# Patient Record
Sex: FEMALE | Race: WHITE | NOT HISPANIC OR LATINO | Employment: FULL TIME | URBAN - METROPOLITAN AREA
[De-identification: names, ages, dates, MRNs, and addresses within clinical notes are randomized per-mention and may not be internally consistent; named-entity substitution may affect disease eponyms.]

---

## 2018-11-01 LAB
HUMAN PAPILLOMAVIRUS (HPV): POSITIVE
PAP SMEAR: ABNORMAL

## 2018-12-20 PROBLEM — R87.610 PAP SMEAR ABNORMALITY OF CERVIX WITH ASCUS FAVORING DYSPLASIA: Status: ACTIVE | Noted: 2018-12-20

## 2018-12-20 PROBLEM — N92.0 MENSES REGULAR WITH EXCESSIVE BLEEDING: Status: ACTIVE | Noted: 2018-12-20

## 2020-10-06 ENCOUNTER — OFFICE VISIT (OUTPATIENT)
Dept: URGENT CARE | Facility: CLINIC | Age: 40
End: 2020-10-06
Payer: MEDICAID

## 2020-10-06 VITALS
TEMPERATURE: 98 F | DIASTOLIC BLOOD PRESSURE: 88 MMHG | SYSTOLIC BLOOD PRESSURE: 144 MMHG | BODY MASS INDEX: 25.55 KG/M2 | HEART RATE: 82 BPM | OXYGEN SATURATION: 99 % | RESPIRATION RATE: 16 BRPM | WEIGHT: 159 LBS | HEIGHT: 66 IN

## 2020-10-06 DIAGNOSIS — M54.2 NECK PAIN: Primary | ICD-10-CM

## 2020-10-06 DIAGNOSIS — M62.838 MUSCLE SPASM: ICD-10-CM

## 2020-10-06 PROCEDURE — 72040 X-RAY EXAM NECK SPINE 2-3 VW: CPT | Mod: S$GLB,,, | Performed by: RADIOLOGY

## 2020-10-06 PROCEDURE — 99203 OFFICE O/P NEW LOW 30 MIN: CPT | Mod: S$GLB,,, | Performed by: NURSE PRACTITIONER

## 2020-10-06 PROCEDURE — 99203 PR OFFICE/OUTPT VISIT, NEW, LEVL III, 30-44 MIN: ICD-10-PCS | Mod: S$GLB,,, | Performed by: NURSE PRACTITIONER

## 2020-10-06 PROCEDURE — 72040 XR CERVICAL SPINE 2 OR 3 VIEWS: ICD-10-PCS | Mod: S$GLB,,, | Performed by: RADIOLOGY

## 2020-10-06 RX ORDER — CYCLOBENZAPRINE HCL 10 MG
10 TABLET ORAL 3 TIMES DAILY PRN
Qty: 15 TABLET | Refills: 0 | Status: SHIPPED | OUTPATIENT
Start: 2020-10-06 | End: 2020-10-11

## 2020-10-06 RX ORDER — KETOROLAC TROMETHAMINE 30 MG/ML
30 INJECTION, SOLUTION INTRAMUSCULAR; INTRAVENOUS
Status: COMPLETED | OUTPATIENT
Start: 2020-10-06 | End: 2020-10-06

## 2020-10-06 RX ORDER — NAPROXEN 500 MG/1
500 TABLET ORAL 2 TIMES DAILY WITH MEALS
Qty: 14 TABLET | Refills: 0 | Status: SHIPPED | OUTPATIENT
Start: 2020-10-06 | End: 2020-10-13

## 2020-10-06 RX ADMIN — KETOROLAC TROMETHAMINE 30 MG: 30 INJECTION, SOLUTION INTRAMUSCULAR; INTRAVENOUS at 06:10

## 2020-10-06 NOTE — PATIENT INSTRUCTIONS
PLEASE READ YOUR DISCHARGE INSTRUCTIONS ENTIRELY AS IT CONTAINS IMPORTANT INFORMATION.     DO NOT TAKE ANY MORE NAPROXEN OR IBUPROFEN FOR 24 HOURS AS YOU RECEIVED A LARGE DOSE OF IT VIA INJECTION    Naproxen for pain. Eat with this medication. Consider taking an over the counter antacid (Pepcid, Nexium, Prilosec) to protect your stomach.      Ice to the area. Try an over the counter pain cream like salon pas, icy hot, bioflex.    Flexeril is a muscle relaxer - this medication will make you drowsy. Please do not drive or operate machinery while taking this. Best to take it at night or during the day if you are not driving or going to work. Please take a half first to see how it affects you.     Avoid heavy lifting, straining.      A referral to Family Medicine (establish care) and Orthopedics (neck pain) has been placed for you.  Please call [phone] to make an appt    Please follow up with your regular doctor for further treatment if your symptoms do not improve.      Please arrange follow up with your primary medical clinic as soon as possible. You must understand that you've received an Urgent Care treatment only and that you may be released before all of your medical problems are known or treated. You, the patient, will arrange for follow up as instructed. If your symptoms worsen or fail to improve you should go to the Emergency Room.  WE CANNOT RULE OUT ALL POSSIBLE CAUSES OF YOUR SYMPTOMS IN THE URGENT CARE SETTING PLEASE GO TO THE ER IF YOU FEELS YOUR CONDITION IS WORSENING OR YOU WOULD LIKE EMERGENT EVALUATION.      Neck Sprain or Strain  A sudden force that causes turning or bending of the neck can cause sprain or strain. An example would be the force from a car accident. This can stretch or tear muscles called a strain. It can also stretch or tear ligaments called a sprain. Either of these can cause neck pain. Sometimes neck pain occurs after a simple awkward movement. In either case, muscle spasm is  commonly present and contributes to the pain.     Unless you had a forceful physical injury (for example, a car accident or fall), X-rays are usually not ordered for the initial evaluation of neck pain. If pain continues and dose not respond to medical treatment, X-rays and other tests may be performed at a later time.  Home care  · You may feel more soreness and spasm the first few days after the injury. Rest until symptoms begin to improve.  · When lying down, use a comfortable pillow or a rolled towel that supports the head and keeps the spine in a neutral position. The position of the head should not be tilted forward or backward.  · Apply an ice pack over the injured area for 15 to 20 minutes every 3 to 6 hours. You should do this for the first 24 to 48 hours. You can make an ice pack by filling a plastic bag that seals at the top with ice cubes and then wrapping it with a thin towel. After 48 hours, apply heat (warm shower or warm bath) for 15 to 20 minutes several times a day, or alternate ice and heat.  · You may use over-the-counter pain medicine to control pain, unless another pain medicine was prescribed. If you have chronic liver or kidney disease or ever had a stomach ulcer or GI bleeding, talk with your healthcare provider before using these medicines.  · If a soft cervical collar was prescribed, it should be worn only for periods of increased pain. It should not be worn for more than 3 hours a day, or for a period longer than 1 to 2 weeks.  Follow-up care  Follow up with your healthcare provider as directed. Physical therapy may be needed.  Sometimes fractures dont show up on the first X-ray. Bruises and sprains can sometimes hurt as much as a fracture. These injuries can take time to heal completely. If your symptoms dont improve or they get worse, talk with your healthcare provider. You may need a repeat X-ray or other tests. If X-rays were taken, you will be told of any new findings that may  affect your care.  Call 911  Call 911 if you have:  · Neck swelling, difficulty or painful swallowing  · Difficulty breathing  · Chest pain  When to seek medical advice  Call your healthcare provider right away if any of these occur:  · Pain becomes worse or spreads into your arms  · Weakness or numbness in one or both arms  Date Last Reviewed: 11/19/2015  © 2836-0209 [employer]. [address] [place] [address], [place] [address]. All rights reserved. This information is not intended as a substitute for professional medical care. Always follow your healthcare professional's instructions.

## 2020-10-06 NOTE — PROGRESS NOTES
"Subjective:       Patient ID: Kelly Lopez is a 40 y.o. female.    Vitals:  height is 5' 6" (1.676 m) and weight is 72.1 kg (159 lb). Her temperature is 97.6 °F (36.4 °C). Her blood pressure is 144/88 (abnormal) and her pulse is 82. Her respiration is 16 and oxygen saturation is 99%.     Chief Complaint: Headache and Neck Pain    Pt presents to clinic today for evaluation of neck pain, stiffness, and associated headaches worsening x2 weeks. Pt states pain is making it difficult for her to sleep. Pt states she has had problems with her neck in the past, and was told she had bulging disks. She would like a referral to establish care with PCP and Orthopedics for further evaluation of neck pain today. She has tried taking Naproxen for symptoms, but has since stopped because she does not feel that it was providing much relief. PMHx significant for migraine headaches. Pt states she originally thought pain was a migraine, however she realized pain is originating from her neck. She denies any injury to neck, falls, tingling, numbness, weakness, syncope, eye pain, visual changes, seizures, scalp tenderness, blurred vision, cough, dizziness, swollen glands, sore throat, or N/V/D.    Headache   This is a new problem. The current episode started 1 to 4 weeks ago. Episode frequency: muscle spasms. The problem has been waxing and waning. The pain is located in the frontal region. Radiates to: base of her neck up to her head. The pain quality is not similar to prior headaches. The quality of the pain is described as aching, stabbing and throbbing. The pain is at a severity of 10/10. The pain is severe. Associated symptoms include nausea and neck pain. Pertinent negatives include no abdominal pain, abnormal behavior, anorexia, back pain, blurred vision, coughing, dizziness, drainage, ear pain, eye pain, eye redness, eye watering, facial sweating, fever, hearing loss, insomnia, loss of balance, muscle aches, numbness, " phonophobia, photophobia, rhinorrhea, scalp tenderness, seizures, sinus pressure, sore throat, swollen glands, tingling, tinnitus, visual change, vomiting, weakness or weight loss. The symptoms are aggravated by activity. She has tried NSAIDs for the symptoms. The treatment provided mild relief. Her past medical history is significant for migraine headaches. There is no history of cancer, cluster headaches, hypertension, immunosuppression, migraines in the family, obesity, pseudotumor cerebri, recent head traumas, sinus disease or TMJ.       Constitution: Negative for fever.   HENT: Negative for ear pain, tinnitus, hearing loss, sinus pressure and sore throat.    Neck: Positive for neck pain and neck stiffness. Negative for painful lymph nodes and neck swelling.   Cardiovascular: Negative for chest pain.   Eyes: Negative for eye pain, eye redness, photophobia and blurred vision.   Respiratory: Negative for cough.    Gastrointestinal: Positive for nausea. Negative for abdominal pain, vomiting and diarrhea.   Musculoskeletal: Negative for back pain.   Neurological: Positive for headaches and history of migraines. Negative for dizziness, loss of balance, numbness and seizures.   Hematologic/Lymphatic: Negative for swollen lymph nodes.   Psychiatric/Behavioral: The patient does not have insomnia.        Objective:      Physical Exam   Constitutional: She is oriented to person, place, and time. She appears well-developed.  Non-toxic appearance. She does not appear ill. No distress.      Comments:Pt calm and cooperative speaking in full sentences with ease. Gait smooth and steady. NAD noted.   HENT:   Head: Normocephalic and atraumatic.   Ears:   Right Ear: Hearing, tympanic membrane, external ear and ear canal normal.   Left Ear: Hearing, tympanic membrane, external ear and ear canal normal.   Nose: Nose normal. No mucosal edema, rhinorrhea or nasal deformity. No epistaxis. Right sinus exhibits no maxillary sinus  tenderness and no frontal sinus tenderness. Left sinus exhibits no maxillary sinus tenderness and no frontal sinus tenderness.   Mouth/Throat: Uvula is midline, oropharynx is clear and moist and mucous membranes are normal. No trismus in the jaw. Normal dentition. No uvula swelling. No posterior oropharyngeal erythema.   Eyes: Pupils are equal, round, and reactive to light. Conjunctivae, EOM and lids are normal. No scleral icterus.   Neck: Trachea normal, normal range of motion, full passive range of motion without pain and phonation normal. Neck supple. No neck rigidity.   Cardiovascular: Normal rate, regular rhythm, normal heart sounds and normal pulses.   Pulmonary/Chest: Effort normal and breath sounds normal. No respiratory distress.   Abdominal: Soft. Normal appearance and bowel sounds are normal. She exhibits no distension. There is no abdominal tenderness.   Musculoskeletal: Normal range of motion.         General: No deformity.      Cervical back: She exhibits tenderness, pain and spasm. She exhibits normal range of motion, no bony tenderness, no swelling, no edema, no deformity, no laceration and normal pulse.        Back:    Neurological: She is alert and oriented to person, place, and time. No cranial nerve deficit. She exhibits normal muscle tone. Gait normal. Coordination normal. GCS eye subscore is 4. GCS verbal subscore is 5. GCS motor subscore is 6.   Skin: Skin is warm, dry, intact, not diaphoretic and not pale. Psychiatric: Her speech is normal and behavior is normal. Judgment and thought content normal.   Nursing note and vitals reviewed.      Xr Cervical Spine 2 Or 3 Views    Result Date: 10/6/2020  EXAMINATION: XR CERVICAL SPINE 2 OR 3 VIEWS CLINICAL HISTORY: Cervicalgia. TECHNIQUE: AP, lateral and open mouth views of the cervical spine were performed. COMPARISON: None. FINDINGS: There is reversal of the usual cervical lordosis which may be related to patient positioning or muscle spasm.  There  is minimal anterolisthesis of C3 on C4 and C4 on C5 and minimal retrolisthesis of C5 on C6.  There is mild joint space narrowing of C5-C6 and C6-C7.  There are anterior osteophytes from C4-C5 through C6-C7.  There is no acute fracture or dislocation.  The vertebral body heights are maintained.     Degenerative changes of the cervical spine as above without acute osseous abnormality. Electronically signed by: Getachew Benson Date:    10/06/2020 Time:    19:11  Assessment:       1. Neck pain    2. Muscle spasm        Plan:         Neck pain  -     XR Cervical Spine 2 or 3 Views; Future; Expected date: 10/06/2020  -     ketorolac injection 30 mg  -     naproxen (NAPROSYN) 500 MG tablet; Take 1 tablet (500 mg total) by mouth 2 (two) times daily with meals. for 7 days  Dispense: 14 tablet; Refill: 0  -     Ambulatory referral/consult to Family Practice  -     Ambulatory referral/consult to Orthopedics    Muscle spasm  -     cyclobenzaprine (FLEXERIL) 10 MG tablet; Take 1 tablet (10 mg total) by mouth 3 (three) times daily as needed. This medication will make you drowsy.  Dispense: 15 tablet; Refill: 0  -     Ambulatory referral/consult to Family Practice    Discussed xray findings (Degenerative changes of the cervical spine as above without acute osseous abnormality), diagnosis, and treatment plan today. Referrals to Family Practice sent to establish care as well as Orthopedics for further evaluation of neck pain. All applicable EKG, medical records, labs, imaging reviewed in Epic and discussed with patient. Instructed to follow up with PCP or go to ER if symptoms fail to improve or worsen. Pt verbalizes understanding.       Patient Instructions   PLEASE READ YOUR DISCHARGE INSTRUCTIONS ENTIRELY AS IT CONTAINS IMPORTANT INFORMATION.     DO NOT TAKE ANY MORE NAPROXEN OR IBUPROFEN FOR 24 HOURS AS YOU RECEIVED A LARGE DOSE OF IT VIA INJECTION    Naproxen for pain. Eat with this medication. Consider taking an over the counter  antacid (Pepcid, Nexium, Prilosec) to protect your stomach.      Ice to the area. Try an over the counter pain cream like salon pas, icy hot, bioflex.    Flexeril is a muscle relaxer - this medication will make you drowsy. Please do not drive or operate machinery while taking this. Best to take it at night or during the day if you are not driving or going to work. Please take a half first to see how it affects you.     Avoid heavy lifting, straining.      A referral to Family Medicine (establish care) and Orthopedics (neck pain) has been placed for you.  Please call [phone] to make an appt    Please follow up with your regular doctor for further treatment if your symptoms do not improve.      Please arrange follow up with your primary medical clinic as soon as possible. You must understand that you've received an Urgent Care treatment only and that you may be released before all of your medical problems are known or treated. You, the patient, will arrange for follow up as instructed. If your symptoms worsen or fail to improve you should go to the Emergency Room.  WE CANNOT RULE OUT ALL POSSIBLE CAUSES OF YOUR SYMPTOMS IN THE URGENT CARE SETTING PLEASE GO TO THE ER IF YOU FEELS YOUR CONDITION IS WORSENING OR YOU WOULD LIKE EMERGENT EVALUATION.      Neck Sprain or Strain  A sudden force that causes turning or bending of the neck can cause sprain or strain. An example would be the force from a car accident. This can stretch or tear muscles called a strain. It can also stretch or tear ligaments called a sprain. Either of these can cause neck pain. Sometimes neck pain occurs after a simple awkward movement. In either case, muscle spasm is commonly present and contributes to the pain.     Unless you had a forceful physical injury (for example, a car accident or fall), X-rays are usually not ordered for the initial evaluation of neck pain. If pain continues and dose not respond to medical treatment, X-rays and other  tests may be performed at a later time.  Home care  · You may feel more soreness and spasm the first few days after the injury. Rest until symptoms begin to improve.  · When lying down, use a comfortable pillow or a rolled towel that supports the head and keeps the spine in a neutral position. The position of the head should not be tilted forward or backward.  · Apply an ice pack over the injured area for 15 to 20 minutes every 3 to 6 hours. You should do this for the first 24 to 48 hours. You can make an ice pack by filling a plastic bag that seals at the top with ice cubes and then wrapping it with a thin towel. After 48 hours, apply heat (warm shower or warm bath) for 15 to 20 minutes several times a day, or alternate ice and heat.  · You may use over-the-counter pain medicine to control pain, unless another pain medicine was prescribed. If you have chronic liver or kidney disease or ever had a stomach ulcer or GI bleeding, talk with your healthcare provider before using these medicines.  · If a soft cervical collar was prescribed, it should be worn only for periods of increased pain. It should not be worn for more than 3 hours a day, or for a period longer than 1 to 2 weeks.  Follow-up care  Follow up with your healthcare provider as directed. Physical therapy may be needed.  Sometimes fractures dont show up on the first X-ray. Bruises and sprains can sometimes hurt as much as a fracture. These injuries can take time to heal completely. If your symptoms dont improve or they get worse, talk with your healthcare provider. You may need a repeat X-ray or other tests. If X-rays were taken, you will be told of any new findings that may affect your care.  Call 911  Call 911 if you have:  · Neck swelling, difficulty or painful swallowing  · Difficulty breathing  · Chest pain  When to seek medical advice  Call your healthcare provider right away if any of these occur:  · Pain becomes worse or spreads into your  arms  · Weakness or numbness in one or both arms  Date Last Reviewed: 11/19/2015  © 3958-6781 [employer]. [address] [place] [address], [place] [address]. All rights reserved. This information is not intended as a substitute for professional medical care. Always follow your healthcare professional's instructions.

## 2020-10-07 ENCOUNTER — TELEPHONE (OUTPATIENT)
Dept: ORTHOPEDICS | Facility: CLINIC | Age: 40
End: 2020-10-07

## 2020-10-07 NOTE — TELEPHONE ENCOUNTER
calling to schedule ortho referral, left message to call back    Neck issue, [phone]  Back and spine

## 2020-10-19 ENCOUNTER — OFFICE VISIT (OUTPATIENT)
Dept: FAMILY MEDICINE | Facility: CLINIC | Age: 40
End: 2020-10-19
Payer: MEDICAID

## 2020-10-19 ENCOUNTER — PATIENT OUTREACH (OUTPATIENT)
Dept: ADMINISTRATIVE | Facility: HOSPITAL | Age: 40
End: 2020-10-19

## 2020-10-19 VITALS
HEART RATE: 80 BPM | OXYGEN SATURATION: 98 % | TEMPERATURE: 98 F | WEIGHT: 168.63 LBS | HEIGHT: 66 IN | SYSTOLIC BLOOD PRESSURE: 110 MMHG | DIASTOLIC BLOOD PRESSURE: 80 MMHG | BODY MASS INDEX: 27.1 KG/M2

## 2020-10-19 DIAGNOSIS — G47.01 INSOMNIA DUE TO MEDICAL CONDITION: ICD-10-CM

## 2020-10-19 DIAGNOSIS — Z12.31 BREAST CANCER SCREENING BY MAMMOGRAM: ICD-10-CM

## 2020-10-19 DIAGNOSIS — M47.892 OTHER SPONDYLOSIS, CERVICAL REGION: ICD-10-CM

## 2020-10-19 DIAGNOSIS — M54.2 CERVICALGIA: Primary | ICD-10-CM

## 2020-10-19 DIAGNOSIS — F41.9 ANXIETY: ICD-10-CM

## 2020-10-19 DIAGNOSIS — H60.543 ECZEMA OF EXTERNAL EAR, BILATERAL: ICD-10-CM

## 2020-10-19 DIAGNOSIS — R00.2 PALPITATIONS: ICD-10-CM

## 2020-10-19 PROCEDURE — 99214 OFFICE O/P EST MOD 30 MIN: CPT | Mod: PBBFAC,PO | Performed by: INTERNAL MEDICINE

## 2020-10-19 PROCEDURE — 99214 OFFICE O/P EST MOD 30 MIN: CPT | Mod: S$PBB,,, | Performed by: INTERNAL MEDICINE

## 2020-10-19 PROCEDURE — 99214 PR OFFICE/OUTPT VISIT, EST, LEVL IV, 30-39 MIN: ICD-10-PCS | Mod: S$PBB,,, | Performed by: INTERNAL MEDICINE

## 2020-10-19 PROCEDURE — 99999 PR PBB SHADOW E&M-EST. PATIENT-LVL IV: ICD-10-PCS | Mod: PBBFAC,,, | Performed by: INTERNAL MEDICINE

## 2020-10-19 PROCEDURE — 99999 PR PBB SHADOW E&M-EST. PATIENT-LVL IV: CPT | Mod: PBBFAC,,, | Performed by: INTERNAL MEDICINE

## 2020-10-19 RX ORDER — FLUOXETINE 10 MG/1
10 CAPSULE ORAL DAILY
Qty: 30 CAPSULE | Refills: 11 | Status: SHIPPED | OUTPATIENT
Start: 2020-10-19 | End: 2021-09-21 | Stop reason: SDUPTHER

## 2020-10-19 RX ORDER — METHYLPREDNISOLONE 4 MG/1
TABLET ORAL
Qty: 1 PACKAGE | Refills: 0 | Status: SHIPPED | OUTPATIENT
Start: 2020-10-19 | End: 2020-11-09

## 2020-10-19 RX ORDER — ZOLPIDEM TARTRATE 5 MG/1
5 TABLET ORAL NIGHTLY PRN
Qty: 30 TABLET | Refills: 0 | Status: SHIPPED | OUTPATIENT
Start: 2020-10-19 | End: 2021-09-21

## 2020-10-19 NOTE — LETTER
October 19, 2020      Yi Elliott, NP  [address] [facility] [place] [address]           [facility] [address] [place] [address]  Phone: [phone]  Fax: [phone]          Patient: Kelly Lopez   MR Number: [id number]   YOB: 1980   Date of Visit: 10/19/2020       Dear Yi Elliott:    Thank you for referring Kelly Lopez to me for evaluation. Attached you will find relevant portions of my assessment and plan of care.    If you have questions, please do not hesitate to call me. I look forward to following Kelly Lopze along with you.    Sincerely,    Aleksandr Fowler MD    Enclosure  CC:  No Recipients    If you would like to receive this communication electronically, please contact [email or web address] or [phone] to request more information on [employer] access.    For providers and/or their staff who would like to refer a patient to [facility], please contact us through our one-stop-shop provider referral line, [facility], at [phone].    If you feel you have received this communication in error or would no longer like to receive these types of communications, please e-mail [email or web address]

## 2020-10-19 NOTE — PROGRESS NOTES
Patient ID: Kelly Lopez     Chief Complaint:   Chief Complaint   Patient presents with    Establish Care    pain in back of neck     consistent throbs throgh out entire head        HPI: New Patient to me w/ a few complaints:     Neck pain (cervicalgia) x many years, s/p SHARRI without improvement- has been MUCH worse x 1 month. Cervical XR reviewed and it does have degenerative joint disease- needs MRI due to tingling in bilateral hands.   Insomnia unrelieved by Melatonin- try Ambien. She does want ability to awaken if needed, so I asked her to try it on the weekend.   Anxiety (longstanding)- requests refill on Prozac 10 mg / day as she's felt better on it.   Palpitations and father has A-fib and sister has an unknown heart condition- refer to Cards.   Concerned about weight gain. I eventually want to get her back tot he gym, but cehck labs first.     Review of Systems   Constitutional: Negative.    HENT: Negative.    Eyes: Negative.    Respiratory: Negative.    Cardiovascular: Positive for chest pain and palpitations.   Gastrointestinal: Negative.    Endocrine: Negative.    Genitourinary: Negative.    Musculoskeletal: Negative.    Skin: Negative.    Allergic/Immunologic: Negative.    Neurological: Negative.    Hematological: Negative.    Psychiatric/Behavioral: Negative.           Objective:      Physical Exam   Physical Exam  Vitals signs and nursing note reviewed.   Constitutional:       Appearance: Normal appearance. She is well-developed.   HENT:      Head: Normocephalic and atraumatic.      Nose: Nose normal.   Eyes:      Extraocular Movements: Extraocular movements intact.      Conjunctiva/sclera: Conjunctivae normal.      Pupils: Pupils are equal, round, and reactive to light.   Neck:      Musculoskeletal: Normal range of motion and neck supple.   Cardiovascular:      Rate and Rhythm: Normal rate and regular rhythm.      Pulses: Normal pulses.      Heart sounds: Normal heart sounds.   Pulmonary:       "Effort: Pulmonary effort is normal.      Breath sounds: Normal breath sounds.   Abdominal:      General: Bowel sounds are normal.      Palpations: Abdomen is soft.   Musculoskeletal: Normal range of motion.   Skin:     General: Skin is warm and dry.      Capillary Refill: Capillary refill takes less than 2 seconds.   Neurological:      General: No focal deficit present.      Mental Status: She is alert and oriented to person, place, and time.   Psychiatric:         Mood and Affect: Mood normal.         Behavior: Behavior normal.         Thought Content: Thought content normal.         Judgment: Judgment normal.            Vitals:   Vitals:    10/19/20 1010   BP: 110/80   BP Location: Right arm   Patient Position: Sitting   BP Method: Medium (Manual)   Pulse: 80   Temp: 98 °F (36.7 °C)   TempSrc: Oral   SpO2: 98%   Weight: 76.5 kg (168 lb 10.4 oz)   Height: 5' 6" (1.676 m)          Current Outpatient Medications:     FLUoxetine 10 MG capsule, Take 1 capsule (10 mg total) by mouth once daily., Disp: 30 capsule, Rfl: 11    methylPREDNISolone (MEDROL DOSEPACK) 4 mg tablet, use as directed, Disp: 1 Package, Rfl: 0    zolpidem (AMBIEN) 5 MG Tab, Take 1 tablet (5 mg total) by mouth nightly as needed., Disp: 30 tablet, Rfl: 0   Assessment:       Patient Active Problem List    Diagnosis Date Noted    Anxiety     Cervicalgia     Eczema of external ear, bilateral     Insomnia due to medical condition     Palpitations     Pap smear abnormality of cervix with ASCUS favoring dysplasia 12/20/2018    Menses regular with excessive bleeding 12/20/2018          Plan:       Kelly Loepz  was seen today for follow-up and may need lab work.    Diagnoses and all orders for this visit:    Kelly was seen today for establish care and pain in back of neck.    Diagnoses and all orders for this visit:    Cervicalgia  -     methylPREDNISolone (MEDROL DOSEPACK) 4 mg tablet; use as directed    Insomnia due to medical " condition  -     zolpidem (AMBIEN) 5 MG Tab; Take 1 tablet (5 mg total) by mouth nightly as needed.    Anxiety  -     FLUoxetine 10 MG capsule; Take 1 capsule (10 mg total) by mouth once daily.    Other spondylosis, cervical region  -     MRI Cervical Spine Without Contrast; Future    Breast cancer screening by mammogram  -     Mammo Digital Screening Bilat w/ Driss; Future    Eczema of external ear, bilateral  No treatment per Patient     Palpitations  -     CBC auto differential; Future  -     Comprehensive Metabolic Panel; Future  -     Lipid Panel; Future  -     TSH; Future  -     Ambulatory referral/consult to Cardiology; Future

## 2020-11-02 ENCOUNTER — TELEPHONE (OUTPATIENT)
Dept: FAMILY MEDICINE | Facility: CLINIC | Age: 40
End: 2020-11-02

## 2020-11-02 DIAGNOSIS — M54.2 CERVICALGIA: Primary | ICD-10-CM

## 2020-11-02 NOTE — TELEPHONE ENCOUNTER
----- Message from Patricia Gomez, RT sent at 11/2/2020  8:15 AM CST -----  Regarding: denila/peer to peer  Contact: Libby CASTILLO [id number]  Good morning! Patient is scheduled for an mri on tomorrow, but has been denied by insurance. A peer to peer can be done to overrule. Can you tell me if this will be done before noon today in case patient needs to be canceled? If not, patient will need to notified and canceled.    Thanks

## 2020-11-03 NOTE — TELEPHONE ENCOUNTER
Callback to patient--patient notified, she will try either neuro or back and spine.  Please advise

## 2020-11-03 NOTE — TELEPHONE ENCOUNTER
Spoke with patient and scheduled her an appointment for tomorrow with Peyton Caputo in the [facility], she indicated understanding.

## 2020-11-03 NOTE — TELEPHONE ENCOUNTER
----- Message from Suresh Lacey sent at 11/2/2020  4:25 PM CST -----  Regarding: return call  Type:  Patient Returning Call    Who Called:  pt  Who Left Message for Patient:  thomas  Does the patient know what this is regarding?:  mri  Best Call Back Number:  [phone]   Additional Information:

## 2020-11-04 ENCOUNTER — OFFICE VISIT (OUTPATIENT)
Dept: SPINE | Facility: CLINIC | Age: 40
End: 2020-11-04
Payer: MEDICAID

## 2020-11-04 ENCOUNTER — CLINICAL SUPPORT (OUTPATIENT)
Dept: REHABILITATION | Facility: HOSPITAL | Age: 40
End: 2020-11-04
Payer: MEDICAID

## 2020-11-04 VITALS
SYSTOLIC BLOOD PRESSURE: 119 MMHG | HEIGHT: 66 IN | WEIGHT: 167.13 LBS | HEART RATE: 82 BPM | BODY MASS INDEX: 26.86 KG/M2 | DIASTOLIC BLOOD PRESSURE: 77 MMHG

## 2020-11-04 DIAGNOSIS — M54.2 NECK PAIN: ICD-10-CM

## 2020-11-04 DIAGNOSIS — M47.812 CERVICAL SPONDYLOSIS: ICD-10-CM

## 2020-11-04 DIAGNOSIS — M54.2 CERVICALGIA: ICD-10-CM

## 2020-11-04 DIAGNOSIS — M47.812 CERVICAL SPONDYLOSIS: Primary | ICD-10-CM

## 2020-11-04 DIAGNOSIS — R29.898 DECREASED ROM OF NECK: ICD-10-CM

## 2020-11-04 PROCEDURE — 97161 PT EVAL LOW COMPLEX 20 MIN: CPT | Mod: PO

## 2020-11-04 PROCEDURE — 99999 PR PBB SHADOW E&M-EST. PATIENT-LVL IV: CPT | Mod: PBBFAC,,, | Performed by: PHYSICIAN ASSISTANT

## 2020-11-04 PROCEDURE — 99203 PR OFFICE/OUTPT VISIT, NEW, LEVL III, 30-44 MIN: ICD-10-PCS | Mod: S$PBB,,, | Performed by: PHYSICIAN ASSISTANT

## 2020-11-04 PROCEDURE — 97140 MANUAL THERAPY 1/> REGIONS: CPT | Mod: PO

## 2020-11-04 PROCEDURE — 99203 OFFICE O/P NEW LOW 30 MIN: CPT | Mod: S$PBB,,, | Performed by: PHYSICIAN ASSISTANT

## 2020-11-04 PROCEDURE — 99999 PR PBB SHADOW E&M-EST. PATIENT-LVL IV: ICD-10-PCS | Mod: PBBFAC,,, | Performed by: PHYSICIAN ASSISTANT

## 2020-11-04 PROCEDURE — 99214 OFFICE O/P EST MOD 30 MIN: CPT | Mod: PBBFAC,PN | Performed by: PHYSICIAN ASSISTANT

## 2020-11-04 RX ORDER — TIZANIDINE 4 MG/1
4 TABLET ORAL NIGHTLY PRN
Qty: 40 TABLET | Refills: 0 | Status: SHIPPED | OUTPATIENT
Start: 2020-11-04 | End: 2021-09-21

## 2020-11-04 NOTE — LETTER
November 8, 2020      Aleksandr Fowler MD  [address] [facility] [address] [place] [address]           [place] [facility]  [address] [place] [address]  Phone: [phone]  Fax: [phone]          Patient: Kelly Loepz   MR Number: [id number]   YOB: 1980   Date of Visit: 11/4/2020       Dear Dr. Aleksandr Fowler:    Thank you for referring Kelly Lopez to me for evaluation. Attached you will find relevant portions of my assessment and plan of care.    If you have questions, please do not hesitate to call me. I look forward to following Kelly Lopez along with you.    Sincerely,    Peyton Caputo PA-C    Enclosure  CC:  No Recipients    If you would like to receive this communication electronically, please contact [email or web address] or [phone] to request more information on [employer] access.    For providers and/or their staff who would like to refer a patient to [facility], please contact us through our one-stop-shop provider referral line, [facility], at [phone].    If you feel you have received this communication in error or would no longer like to receive these types of communications, please e-mail [email or web address]

## 2020-11-04 NOTE — PROGRESS NOTES
Back and Spine Consult    Patient ID: Kelly Lopez is a 40 y.o. female.    Chief Complaint   Patient presents with    Neck Pain       Review of Systems   Constitutional: Negative for activity change, appetite change, chills, fever and unexpected weight change.   HENT: Negative for tinnitus, trouble swallowing and voice change.    Respiratory: Negative for apnea, cough, chest tightness and shortness of breath.    Cardiovascular: Negative for chest pain and palpitations.   Gastrointestinal: Negative for constipation, diarrhea, nausea and vomiting.   Genitourinary: Negative for difficulty urinating, dysuria, frequency and urgency.   Musculoskeletal: Positive for neck pain. Negative for back pain, gait problem and neck stiffness.   Skin: Negative for wound.   Neurological: Positive for headaches. Negative for dizziness, tremors, seizures, facial asymmetry, speech difficulty, weakness, light-headedness and numbness.   Psychiatric/Behavioral: Negative for confusion and decreased concentration.       Past Medical History:   Diagnosis Date    Anxiety     Cervicalgia     Eczema of external ear, bilateral     H/O bronchitis     Insomnia due to medical condition     Palpitations      Social History     Socioeconomic History    Marital status: Single     Spouse name: Not on file    Number of children: Not on file    Years of education: Not on file    Highest education level: Not on file   Occupational History    Occupation:     Social Needs    Financial resource strain: Not very hard    Food insecurity     Worry: Never true     Inability: Never true    Transportation needs     Medical: No     Non-medical: No   Tobacco Use    Smoking status: Never Smoker    Smokeless tobacco: Never Used   Substance and Sexual Activity    Alcohol use: Yes     Alcohol/week: 5.0 standard drinks     Types: 5 Glasses of wine per week     Frequency: 4 or more times a week     Drinks per session: 1 or 2      "Binge frequency: Never    Drug use: No    Sexual activity: Not on file   Lifestyle    Physical activity     Days per week: 0 days     Minutes per session: 0 min    Stress: To some extent   Relationships    Social connections     Talks on phone: Not on file     Gets together: Not on file     Attends Oriental orthodox service: Not on file     Active member of club or organization: Not on file     Attends meetings of clubs or organizations: Not on file     Relationship status: Not on file   Other Topics Concern    Not on file   Social History Narrative    Not on file     Family History   Problem Relation Age of Onset    Hyperlipidemia Mother     Hyperlipidemia Father     Atrial fibrillation Father     Stroke Father     Heart murmur Sister      Review of patient's allergies indicates:  No Known Allergies    Current Outpatient Medications:     FLUoxetine 10 MG capsule, Take 1 capsule (10 mg total) by mouth once daily., Disp: 30 capsule, Rfl: 11    zolpidem (AMBIEN) 5 MG Tab, Take 1 tablet (5 mg total) by mouth nightly as needed., Disp: 30 tablet, Rfl: 0    methylPREDNISolone (MEDROL DOSEPACK) 4 mg tablet, use as directed (Patient not taking: Reported on 11/4/2020), Disp: 1 Package, Rfl: 0    Vitals:    11/04/20 0953   BP: 119/77   BP Location: Left arm   Patient Position: Sitting   BP Method: Medium (Automatic)   Pulse: 82   Weight: 75.8 kg (167 lb 1.7 oz)   Height: 5' 6" (1.676 m)       Physical Exam  Vitals signs and nursing note reviewed.   Constitutional:       Appearance: She is well-developed.   HENT:      Head: Normocephalic and atraumatic.   Eyes:      Pupils: Pupils are equal, round, and reactive to light.   Neck:      Musculoskeletal: Normal range of motion and neck supple.   Cardiovascular:      Rate and Rhythm: Normal rate.   Pulmonary:      Effort: Pulmonary effort is normal.   Musculoskeletal: Normal range of motion.   Skin:     General: Skin is warm and dry.   Neurological:      Mental Status: She " is alert and oriented to person, place, and time.      Coordination: Finger-Nose-Finger Test, Heel to Shin Test and Romberg Test normal.      Gait: Gait is intact. Tandem walk normal.      Deep Tendon Reflexes:      Reflex Scores:       Tricep reflexes are 2+ on the right side and 2+ on the left side.       Bicep reflexes are 2+ on the right side and 2+ on the left side.       Brachioradialis reflexes are 2+ on the right side and 2+ on the left side.       Patellar reflexes are 2+ on the right side and 2+ on the left side.       Achilles reflexes are 2+ on the right side and 2+ on the left side.  Psychiatric:         Speech: Speech normal.         Behavior: Behavior normal.         Thought Content: Thought content normal.         Judgment: Judgment normal.         Neurologic Exam     Mental Status   Oriented to person, place, and time.   Oriented to person.   Oriented to place.   Oriented to time.   Follows 3 step commands.   Attention: normal. Concentration: normal.   Speech: speech is normal   Level of consciousness: alert  Knowledge: consistent with education.   Able to name object. Able to read. Able to repeat. Able to write. Normal comprehension.     Cranial Nerves     CN II   Visual acuity: normal  Right visual field deficit: none  Left visual field deficit: none     CN III, IV, VI   Pupils are equal, round, and reactive to light.  Right pupil: Size: 3 mm. Shape: regular. Reactivity: brisk. Consensual response: intact.   Left pupil: Size: 3 mm. Shape: regular. Reactivity: brisk. Consensual response: intact.   CN III: no CN III palsy  CN VI: no CN VI palsy  Nystagmus: none   Diplopia: none  Ophthalmoparesis: none  Conjugate gaze: present    CN V   Right facial sensation deficit: none  Left facial sensation deficit: none    CN VII   Right facial weakness: none  Left facial weakness: none    CN VIII   Hearing: intact    CN IX, X   CN IX normal.   CN X normal.     CN XI   Right sternocleidomastoid strength:  normal  Left sternocleidomastoid strength: normal  Right trapezius strength: normal  Left trapezius strength: normal    CN XII   Fasciculations: absent  Tongue deviation: none    Motor Exam   Muscle bulk: normal  Overall muscle tone: normal  Right arm pronator drift: absent  Left arm pronator drift: absent    Strength   Right neck flexion: 5/5  Left neck flexion: 5/5  Right neck extension: 5/5  Left neck extension: 5/5  Right deltoid: 5/5  Left deltoid: 5/5  Right biceps: 5/5  Left biceps: 5/5  Right triceps: 5/5  Left triceps: 5/5  Right wrist flexion: 5/5  Left wrist flexion: 5/5  Right wrist extension: 5/5  Left wrist extension: 5/5  Right interossei: 55  Left interossei: /  Right abdominals: 5  Left abdominals: 5  Right iliopsoas:   Left iliopsoas: 5/  Right quadriceps: 5/5  Left quadriceps: 5/  Right hamstrin/5  Left hamstrin/5  Right glutei:   Left glutei:   Right anterior tibial: 5/5  Left anterior tibial: 5/  Right posterior tibial: 5/  Left posterior tibial: 5/  Right peroneal: 5  Left peroneal:   Right gastroc: 5/  Left gastroc:     Sensory Exam   Right arm light touch: normal  Left arm light touch: normal  Right leg light touch: normal  Left leg light touch: normal  Right arm vibration: normal  Left arm vibration: normal  Right arm pinprick: normal  Left arm pinprick: normal    Gait, Coordination, and Reflexes     Gait  Gait: normal    Coordination   Romberg: negative  Finger to nose coordination: normal  Heel to shin coordination: normal  Tandem walking coordination: normal    Tremor   Resting tremor: absent  Intention tremor: absent  Action tremor: absent    Reflexes   Right brachioradialis: 2+  Left brachioradialis: 2+  Right biceps: 2+  Left biceps: 2+  Right triceps: 2+  Left triceps: 2+  Right patellar: 2+  Left patellar: 2+  Right achilles: 2+  Left achilles: 2+  Right Gonsalez: absent  Left Gonsalez: absent  Right ankle clonus: absent  Left ankle clonus:  absent      Provider dictation:    40 year old female who is relatively healthy is referred by Dr. Fowler for evaluation of neck pain/ headaches.  She awoke one morning 2 months ago with pain in the neck/ head.  She denies any trauma.  Pain is felt in the left side and posterior neck up tot he base of the skull.  At its worst, she had pain all over the head affecting the left greater than right side.  She tried a prednisone taper (started 10-19-20) without benefit.  She has also tried naproxen and ibuprofen.  She has not had PT or SHARRI.    NDI:  68%.  PHQ:  23.    On exam, she has 5/5 strength with 2+ DTR and no sensory deficits.  Gait and station fluid.  Denies bowel/ bladder dysfunction.  Negative Hoffmans.      Xray cervical spine from 10-6-20 reviewed.  Degenerative changes are present in the mid cervical spine at C4/5, C5/6 and C6/7.    In conclusion, Ms. Mendoza has acute onset left/ posterior neck pain with headaches:  Differential at this time time includes myofascial pain/ spasms, occipital neuralgia or pain associated with degenerative changes.  Given she has no radiculopathy and no neurological deficits, I recommend PT.  We will try a different muscle relaxant - stop flexeril and start zanaflex.  If no improvement with PT, we can consider pursue MRI to consider SHARRI, MBB/ RFA, and occiptial nerve block.  Follow up in 8 weeks after PT.      Visit Diagnosis:  Cervicalgia  -     Ambulatory referral[facility]consult to [facility]        Total time spent counseling greater than fifty percent of total visit time.  Counseling included discussion regarding imaging findings, diagnosis possibilities, treatment options, risks and benefits.   The patient had many questions regarding the options and long-term effects.

## 2020-11-04 NOTE — PLAN OF CARE
[facility]  Physical Therapy Initial Evaluation    Name: Kelly Lopez  Clinic Number: [id number]    Therapy Diagnosis:   Encounter Diagnoses   Name Primary?    Cervicalgia     Cervical spondylosis     Neck pain     Decreased ROM of neck      Physician: Peyton Caputo PA-C  Physician Orders: PT Eval and Treat   Medical Diagnosis from Referral: Cervical spondylosis   Cervicalgia   Evaluation Date: 11/4/2020  Authorization Period Expiration: 11/04/201  Plan of Care Expiration: 12/04/2020  Visit # / Visits authorized: 1/ 1    Time In: 1055  Time Out: 1135  Total Billable Time: 40 minutes    Precautions: Standard    Subjective   Date of onset: Two months   History of current condition - Kelly reports: She reports she woke up two months ago with neck pain without any mechanism injury. She denies any shooting or referral pain into her hands. Kelly reports her pain is only on the (L) side and follows a anitha horn type pattern. She states she has tried Advil, ibuprofen, and muscle relaxers. She reports she is here in order to get her MRI.     Medical History:   Past Medical History:   Diagnosis Date    Anxiety     Cervicalgia     Eczema of external ear, bilateral     H/O bronchitis     Insomnia due to medical condition     Palpitations        Surgical History:   Kelly Lopez  has a past surgical history that includes Tonsillectomy; Cervical conization w/ laser; Epidural block injection; Tubal ligation; and Thermal ablation of endometrium using hysteroscopy (N/A, 12/20/2018).    Medications:   Kelly has a current medication list which includes the following prescription(s): fluoxetine, methylprednisolone, tizanidine, and zolpidem.    Allergies:   Review of patient's allergies indicates:  No Known Allergies     Imaging, X-ray: Degenerative changes of the cervical spine as above without acute osseous abnormality.    Prior Therapy: No   Social History: Lives with  family   Occupation: [employer], desk job  Prior Level of Function: No limitations with her neck   Current Level of Function: Chronic neck pain with all ADLs     Pain:  Current 8/10, worst 10/10, best 6/10   Location: left neck   Description: Throbbing  Aggravating Factors: Everything  Easing Factors: nothing    Pts goals: To have no pain, get an MRI    Red Flag Screening:   Cough  Sneeze  Strain: (+)  Bladder/ bowel: (--)  Falls: (--)  Night pain: (--)  Unexplained weight loss: (--)  General health: Anxiety      Objective     Observation: Patient in no acute distress    Posture: Forward head posture in sitting    Cervical Range of Motion:    % Limitation Pain   Flexion 90% Increased pain     Extension 50% Increased pain, guarding     Right Rotation 75% Increased pain, guarding     Left Rotation 90% Increased pain, guarding        Shoulder Range of Motion:   Shoulder Left Right   Flexion WFL WFL   Abduction WFL WFL   Flexion + ER WFL WFL   Extension + IR WFL WFL     No pain with shoulder AROM    Strength:    Upper Extremity Strength  (R) UE  (L) UE    Shoulder flexion: 4+/5 Shoulder flexion: 4+/5   Shoulder Abduction: 4+/5 Shoulder abduction: 4+/5   Shoulder ER 4+/5 Shoulder ER 4+/5   Shoulder IR 5/5 Shoulder IR 5/5   Elbow flexion: 5/5 Elbow flexion: 5/5   Elbow extension: 5/5 Elbow extension: 5/5   Wrist flexion: 5/5 Wrist flexion: 5/5   Wrist extension: 5/5 Wrist extension: 5/5    4+/5 : 4+/5         Special Tests:  Distraction Negative   Compression Negative   Spurlings Positive   Sharp-Keo Negative   Lateral Flexion Alar Ligament Negative   DNF test Negative      Reflexes:    Reflex Left Right   Gonsalez Negatve Negatve     Joint Mobility: No gross deficits with PA's, Transverse glides,      Palpation: TTP (L) Splenius capitis      Sensation: Intact to LT C3-T2      CMS Impairment/Limitation/Restriction for FOTO Neck Survey    Therapist reviewed FOTO scores for Kelly Lopez on  11/4/2020.   FOTO documents entered into [employer] - see Media section.    Limitation Score: 54%  Category: Mobility       TREATMENT   Treatment Time In: 1125  Treatment Time Out: 1135  Total Treatment time separate from Evaluation: 10 minutes    Kelly received the following manual therapy techniques: Soft tissue Mobilization and Dry Needling were applied to the: (L) splenius capitis for 10 minutes, including:  Discussed the purpose, mechanism, and indications for dry needling with Kelly . Patient was cleared of all precautions and contraindications and pt signed written consent and gave verbal consent to dry needling Rx today.   Palpation used to determine dry needling sites. Increased tone noted at (L) splenius capitis. Pt rec'd dry needling in prone position to  (L) splenius capitis with 40 mm needles.  Pt tolerated treatment well and was not in any distress at the completion of treatment.       Home Exercises and Patient Education Provided    Education provided:   - Role of PT, PT POC    Written Home Exercises Provided: No.    Assessment   Kelly is a 40 y.o. female referred to outpatient Physical Therapy with a medical diagnosis of Cervical spondylosis, Cervicalgia. Physical exam is consistent with medical diagnosis. Only able to reproduce pain with palpation to splenius capitis. No radicular symptoms and full passive cervical ROM. Primary impairments include AROM, joint mobility, strength, soft tissue restrictions, and pain which limits functional mobility. This pt is an excellent candidate for skilled PT tx and stands to benefit from a combination of manual therapy including joint mobilizations with trigger point/myofacscial release, therapeutic exercise to establish core/joint stability, neuromuscular re-education, dry needling, and modalities Prn. The pt has been educated on their dx/POC and consents to further PT tx.    Pt prognosis is Good.   Pt will benefit from skilled outpatient Physical Therapy  to address the deficits stated above and in the chart below, provide pt/family education, and to maximize pt's level of independence.     Plan of care discussed with patient: Yes  Pt's spiritual, cultural and educational needs considered and patient is agreeable to the plan of care and goals as stated below:     Anticipated Barriers for therapy: Attitudes     Medical Necessity is demonstrated by the following  History  Co-morbidities and personal factors that may impact the plan of care Co-morbidities:   anxiety    Personal Factors:   attitudes     low   Examination  Body Structures and Functions, activity limitations and participation restrictions that may impact the plan of care Body Regions:   neck    Body Systems:    ROM  strength    Participation Restrictions:   Trouble sleeping, pain at rest limiting job duties    Activity limitations:   Learning and applying knowledge  no deficits    General Tasks and Commands  no deficits    Communication  no deficits    Mobility  lifting and carrying objects  driving (bike, car, motorcycle)    Self care  no deficits    Domestic Life  cooking  doing house work (cleaning house, washing dishes, laundry)    Interactions/Relationships  no deficits    Life Areas  employment    Community and Social Life  recreation and leisure         low   Clinical Presentation stable and uncomplicated low   Decision Making/ Complexity Score: low     Goals:  Short Term Goals (2 Weeks):   1) Pt will demonstrate compliance with initial home exercise program as prescribed by physical therapist to improve independence with management of condition.  2) Pt to improve active range of motion in active cervical rotation by 25% to allow for improved functional mobility.  3) Pt to report cervical pain of <5/10 at worst to improve tolerance to ADLs and job duties.    Long Term Goals (4 Weeks):   1. Pt to achieve <41% limitation as measured by the FOTO to demonstrate decreased disability.  2) Pt to improve  active range of motion in gross cervical ROM <10% to allow for improved functional mobility including driving.  3) Pt to report cervical pain of <2/10 at worst to improve tolerance to ADLs, job duties, sleeping.  4) Pt to increase strength to at least 5/5 of muscles tested to allow for improvement in functional activities such as maintaining good posture.    Plan   Plan of care Certification: 11/4/2020 to 12/04/2020.    Outpatient Physical Therapy 2 times weekly for 8 visits to include the following interventions: Manual Therapy, Moist Heat/ Ice, Neuromuscular Re-ed, Patient Education, Therapeutic Activites, Therapeutic Exercise and dry needling.     Job Collins, PT

## 2020-11-04 NOTE — PROGRESS NOTES
Physical Therapy Daily Treatment Note     Name: Kelly Lopez  Clinic Number: [id number]    Therapy Diagnosis:   Encounter Diagnoses   Name Primary?    Neck pain     Decreased ROM of neck      Physician: Peyton Caputo PA-C    Visit Date: 11/5/2020  Physician Orders: PT Eval and Treat   Medical Diagnosis from Referral: Cervical spondylosis   Cervicalgia   Evaluation Date: 11/4/2020  Authorization Period Expiration: 11/04/201  Plan of Care Expiration: 12/04/2020  Visit # / Visits authorized: 2/2      Time In: 0753 AM * pt with late arrival  Time Out: 0835 AM  Total Billable Time: 42 minutes     Precautions: Standard      Subjective     Pt reports: Pain is still constant.  Pt did not notice a difference with the dry needling.  Pt reports at times, the pain makes her nauseous.  Pt is frustrated that she has not been able to find any relief so far.   Pt reports it feels like constant pressure in her head all the time.  Response to previous treatment: no change  Functional change: too soon to tell    Pain: 8/10  Location:  left neck        Objective     Kelly received therapeutic exercises to develop strength, ROM and flexibility for 32 minutes including:    UBE 3'/3' collected subjective  Supine chin tucks x 10  Supine horizontal abduction YTB x 20  Supine X YTB x 20    Brugger's postural exercise YTB 2x10  Shoulder rows GTB x 20  Shoulder extension RTB x 20  CC lat pulldown #13 x 20  Chin nods with ball on wall x 20    Kelly received the following manual therapy techniques: Manual traction and Myofacial release were applied to the: neck for 10 minutes, including:  Sub occipital release  Gentle cervical traction      Home Exercises Provided and Patient Education Provided     Education provided:   - HEP    Written Home Exercises Provided: yes. Pt had to leave for appointment so print out needs to be given next treatment.  Exercises were reviewed and Kelly was able to demonstrate them prior to the end  of the session.  Kelly demonstrated good  understanding of the education provided.       Assessment     Pt tolerated treatment fairly well with no increase in symptoms following treatment.  Pt able to do all requested exercises without complaints.  At this time, patient is not able to say what increases/decreases pain and symptoms.  Will continue trying to progress and increase strength as tolerated.  Kelly is progressing well towards her goals.   Pt prognosis is Good.     Pt will continue to benefit from skilled outpatient physical therapy to address the deficits listed in the problem list box on initial evaluation, provide pt/family education and to maximize pt's level of independence in the home and community environment.     Pt's spiritual, cultural and educational needs considered and pt agreeable to plan of care and goals.    Anticipated barriers to physical therapy: Attitudes    Goals:     Short Term Goals (2 Weeks):   1) Pt will demonstrate compliance with initial home exercise program as prescribed by physical therapist to improve independence with management of condition.  2) Pt to improve active range of motion in active cervical rotation by 25% to allow for improved functional mobility.  3) Pt to report cervical pain of <5/10 at worst to improve tolerance to ADLs and job duties.     Long Term Goals (4 Weeks):   1. Pt to achieve <41% limitation as measured by the FOTO to demonstrate decreased disability.  2) Pt to improve active range of motion in gross cervical ROM <10% to allow for improved functional mobility including driving.  3) Pt to report cervical pain of <2/10 at worst to improve tolerance to ADLs, job duties, sleeping.  4) Pt to increase strength to at least 5/5 of muscles tested to allow for improvement in functional activities such as maintaining good posture.      Plan     Plan of care Certification: 11/4/2020 to 12/04/2020.       Rosa Stephens, HENRY

## 2020-11-05 ENCOUNTER — CLINICAL SUPPORT (OUTPATIENT)
Dept: REHABILITATION | Facility: HOSPITAL | Age: 40
End: 2020-11-05
Payer: MEDICAID

## 2020-11-05 ENCOUNTER — HOSPITAL ENCOUNTER (OUTPATIENT)
Dept: RADIOLOGY | Facility: HOSPITAL | Age: 40
Discharge: HOME | End: 2020-11-05
Attending: INTERNAL MEDICINE
Payer: MEDICAID

## 2020-11-05 DIAGNOSIS — Z12.31 BREAST CANCER SCREENING BY MAMMOGRAM: ICD-10-CM

## 2020-11-05 DIAGNOSIS — R29.898 DECREASED ROM OF NECK: ICD-10-CM

## 2020-11-05 DIAGNOSIS — M54.2 NECK PAIN: ICD-10-CM

## 2020-11-05 PROCEDURE — 97110 THERAPEUTIC EXERCISES: CPT | Mod: PO,CQ

## 2020-11-05 PROCEDURE — 77063 MAMMO DIGITAL SCREENING BILAT WITH TOMO: ICD-10-PCS | Mod: 26,,, | Performed by: RADIOLOGY

## 2020-11-05 PROCEDURE — 77067 MAMMO DIGITAL SCREENING BILAT WITH TOMO: ICD-10-PCS | Mod: 26,,, | Performed by: RADIOLOGY

## 2020-11-05 PROCEDURE — 77067 SCR MAMMO BI INCL CAD: CPT | Mod: TC,PO

## 2020-11-05 PROCEDURE — 77067 SCR MAMMO BI INCL CAD: CPT | Mod: 26,,, | Performed by: RADIOLOGY

## 2020-11-05 PROCEDURE — 77063 BREAST TOMOSYNTHESIS BI: CPT | Mod: 26,,, | Performed by: RADIOLOGY

## 2020-11-09 ENCOUNTER — TELEPHONE (OUTPATIENT)
Dept: SPINE | Facility: CLINIC | Age: 40
End: 2020-11-09

## 2020-11-09 ENCOUNTER — TELEPHONE (OUTPATIENT)
Dept: FAMILY MEDICINE | Facility: CLINIC | Age: 40
End: 2020-11-09

## 2020-11-09 NOTE — TELEPHONE ENCOUNTER
----- Message from Justine Wright sent at 11/9/2020  4:09 PM CST -----  Contact: self  Type: Needs Medical Advice  Who Called:  patient     Best Call Back Number: [phone] (home)     Additional Information: patient has copies of her MRI of her neck and brain, she would like to drop off for Dr. Fowler to view to make suggestions on who she should see, please contact to advise.

## 2020-11-09 NOTE — TELEPHONE ENCOUNTER
Called patient to let her know what Peyton Caputo advised regarding the MRI's that she had done at [facility], got voicemail, left return call message.

## 2020-11-09 NOTE — TELEPHONE ENCOUNTER
Patient called to let Peyton Caputo know that her brother-in-law got her 2 MRI's done at [facility] [facility], one of the neck and one of the brain. She said he showed her the images and they showed that she has a brain tumor. She would like to know what Peyton says is the next step. I spoke with Peyton and she said she needs to have the images uploaded into our system and then she will have the neurosurgeons review them and give her their opinion about what should be done next. I notified the patient.

## 2020-11-10 ENCOUNTER — OFFICE VISIT (OUTPATIENT)
Dept: FAMILY MEDICINE | Facility: CLINIC | Age: 40
End: 2020-11-10
Payer: MEDICAID

## 2020-11-10 VITALS
SYSTOLIC BLOOD PRESSURE: 110 MMHG | BODY MASS INDEX: 26.82 KG/M2 | TEMPERATURE: 99 F | WEIGHT: 166.88 LBS | OXYGEN SATURATION: 98 % | HEART RATE: 82 BPM | HEIGHT: 66 IN | DIASTOLIC BLOOD PRESSURE: 82 MMHG

## 2020-11-10 DIAGNOSIS — G93.89 CEREBELLAR MASS: Primary | ICD-10-CM

## 2020-11-10 PROCEDURE — 99999 PR PBB SHADOW E&M-EST. PATIENT-LVL IV: CPT | Mod: PBBFAC,,, | Performed by: INTERNAL MEDICINE

## 2020-11-10 PROCEDURE — 99999 PR PBB SHADOW E&M-EST. PATIENT-LVL IV: ICD-10-PCS | Mod: PBBFAC,,, | Performed by: INTERNAL MEDICINE

## 2020-11-10 PROCEDURE — 99213 PR OFFICE/OUTPT VISIT, EST, LEVL III, 20-29 MIN: ICD-10-PCS | Mod: S$PBB,,, | Performed by: INTERNAL MEDICINE

## 2020-11-10 PROCEDURE — 99213 OFFICE O/P EST LOW 20 MIN: CPT | Mod: S$PBB,,, | Performed by: INTERNAL MEDICINE

## 2020-11-10 PROCEDURE — 99214 OFFICE O/P EST MOD 30 MIN: CPT | Mod: PBBFAC,PO | Performed by: INTERNAL MEDICINE

## 2020-11-10 NOTE — PROGRESS NOTES
"  Subjective     Kelly Lopez is a 40 y.o. old, female here for Cyst (MRI results ,cyst in brain )    Patient is here requesting MRI results. She states she requested an MRI from her PCP initially for posterior head pain.  She states her Brother-in-law, Dr. Adrian Cruz, ordered an MRI of her brain and described a cystic lesion pushing on her cerebellum  Feels like something throbbing and pushing on her brain. She did not want to do PT before imaging was done.    ROS  Medications     No outpatient medications have been marked as taking for the 11/10/20 encounter (Office Visit) with Jatinder Salinas MD.     Objective     /82 (BP Location: Right arm, Patient Position: Sitting, BP Method: Large (Manual))   Pulse 82   Temp 98.7 °F (37.1 °C) (Oral)   Ht 5' 6" (1.676 m)   Wt 75.7 kg (166 lb 14.2 oz)   SpO2 98%   BMI 26.94 kg/m²   Physical Exam   Constitutional: She appears well-developed. No distress.     Assessment and Plan     1. Cerebellar mass  Reviewing her imaging she does have a substantial cystic mass near her cerebellum, recommend urgent NSG consult.  - Ambulatory referral/consult to Neurosurgery; Future    ___________________  Jatinder Salinas MD  Internal Medicine and Pediatrics    "

## 2020-11-12 ENCOUNTER — OFFICE VISIT (OUTPATIENT)
Dept: NEUROSURGERY | Facility: CLINIC | Age: 40
End: 2020-11-12
Payer: MEDICAID

## 2020-11-12 VITALS
HEIGHT: 66 IN | DIASTOLIC BLOOD PRESSURE: 81 MMHG | SYSTOLIC BLOOD PRESSURE: 125 MMHG | HEART RATE: 87 BPM | WEIGHT: 168 LBS | BODY MASS INDEX: 27 KG/M2

## 2020-11-12 DIAGNOSIS — G93.0 ARACHNOID CYST: Primary | ICD-10-CM

## 2020-11-12 DIAGNOSIS — G93.89 CEREBELLAR MASS: ICD-10-CM

## 2020-11-12 DIAGNOSIS — R51.9 ACUTE NONINTRACTABLE HEADACHE, UNSPECIFIED HEADACHE TYPE: ICD-10-CM

## 2020-11-12 PROCEDURE — 99214 OFFICE O/P EST MOD 30 MIN: CPT | Mod: PBBFAC,PN | Performed by: NEUROLOGICAL SURGERY

## 2020-11-12 PROCEDURE — 99999 PR PBB SHADOW E&M-EST. PATIENT-LVL IV: CPT | Mod: PBBFAC,,, | Performed by: NEUROLOGICAL SURGERY

## 2020-11-12 PROCEDURE — 99999 PR PBB SHADOW E&M-EST. PATIENT-LVL IV: ICD-10-PCS | Mod: PBBFAC,,, | Performed by: NEUROLOGICAL SURGERY

## 2020-11-12 PROCEDURE — 99214 OFFICE O/P EST MOD 30 MIN: CPT | Mod: S$PBB,,, | Performed by: NEUROLOGICAL SURGERY

## 2020-11-12 PROCEDURE — 99214 PR OFFICE/OUTPT VISIT, EST, LEVL IV, 30-39 MIN: ICD-10-PCS | Mod: S$PBB,,, | Performed by: NEUROLOGICAL SURGERY

## 2020-11-12 RX ORDER — MELOXICAM 15 MG/1
15 TABLET ORAL DAILY
Qty: 30 TABLET | Refills: 0 | Status: SHIPPED | OUTPATIENT
Start: 2020-11-12 | End: 2021-09-21

## 2020-11-12 NOTE — LETTER
November 17, 2020      Jatinder Salinas MD  [address] [facility] [address] [place] [address]           [facility]  [address] [place] [address]  Phone: [phone]  Fax: [phone]          Patient: Kelly Lopez   MR Number: [id number]   YOB: 1980   Date of Visit: 11/12/2020       Dear Dr. Jatinder Salinas:    Thank you for referring Kelly Lopez to me for evaluation. Attached you will find relevant portions of my assessment and plan of care.    If you have questions, please do not hesitate to call me. I look forward to following Kelly Lopez along with you.    Sincerely,    Shiv Mack MD    Enclosure  CC:  No Recipients    If you would like to receive this communication electronically, please contact [email or web address] or [phone] to request more information on [employer] Link access.    For providers and/or their staff who would like to refer a patient to [facility], please contact us through our one-stop-shop provider referral line, [facility], at [phone].    If you feel you have received this communication in error or would no longer like to receive these types of communications, please e-mail [email or web address]

## 2020-11-12 NOTE — PROGRESS NOTES
Neurosurgery History & Physical    Patient ID: Kelly Lopez is a 40 y.o. female.    Chief Complaint   Patient presents with    Other Misc     Patient is here for head pain and a brain cyst. She has had shooting pain since September. She originally thought this was a migraine however it never went away. She feels building pressure in the posterior part of her head.        History of Present Illness:     40 year old female with history of neck pain who was in her usual state of health until September when she had acute onset of headaches. No significant events or injury surrounding the onset. Her headaches are located in the occipital region. They are constant but severity waxes and wanes. She does not take any medication for this. When the pain is severe, she experiences nausea, blurred vision. She reports intense pressure in occipital scalp/posterior neck. Her pain interferes with her daily activities and quality of life. She was evaluated by Dr. Cruz in Pain Mgmt who previously treated her for cervical spine issues who ordered repeat imaging and requested she follow up with NSGY regarding results. She has participated in Physical Therapy as they thought this may be related to cervical spine. She has also trialed anti-spasmodics in effort to relieve pain without success. She denies weakness, numbness/tingling, gait instability.     She has undergone prior imaging and injections for cervical spine. Has never seen NSGY or had previous brain imaging.        Review of Systems   Constitutional: Negative for activity change, fatigue and fever.   HENT: Negative for trouble swallowing.    Eyes: Negative for visual disturbance.   Respiratory: Negative for chest tightness and shortness of breath.    Cardiovascular: Negative for chest pain.   Gastrointestinal: Positive for nausea. Negative for vomiting.   Genitourinary: Negative for difficulty urinating and frequency.   Musculoskeletal: Positive for neck pain.  Negative for back pain and gait problem.   Skin: Negative for wound.   Neurological: Positive for headaches. Negative for weakness and numbness.   Psychiatric/Behavioral: Negative for confusion and suicidal ideas.       Past Medical History:   Diagnosis Date    Anxiety     Cervicalgia     Eczema of external ear, bilateral     H/O bronchitis     Insomnia due to medical condition     Palpitations      Social History     Socioeconomic History    Marital status: Single     Spouse name: Not on file    Number of children: Not on file    Years of education: Not on file    Highest education level: Not on file   Occupational History    Occupation:     Social Needs    Financial resource strain: Not very hard    Food insecurity     Worry: Never true     Inability: Never true    Transportation needs     Medical: No     Non-medical: No   Tobacco Use    Smoking status: Never Smoker    Smokeless tobacco: Never Used   Substance and Sexual Activity    Alcohol use: Yes     Alcohol/week: 5.0 standard drinks     Types: 5 Glasses of wine per week     Frequency: 4 or more times a week     Drinks per session: 1 or 2     Binge frequency: Never    Drug use: No    Sexual activity: Not on file   Lifestyle    Physical activity     Days per week: 0 days     Minutes per session: 0 min    Stress: To some extent   Relationships    Social connections     Talks on phone: Not on file     Gets together: Not on file     Attends Sikh service: Not on file     Active member of club or organization: Not on file     Attends meetings of clubs or organizations: Not on file     Relationship status: Not on file   Other Topics Concern    Not on file   Social History Narrative    Not on file     Family History   Problem Relation Age of Onset    Hyperlipidemia Mother     Hyperlipidemia Father     Atrial fibrillation Father     Stroke Father     Heart murmur Sister      Review of patient's allergies  "indicates:  No Known Allergies    Current Outpatient Medications:     FLUoxetine 10 MG capsule, Take 1 capsule (10 mg total) by mouth once daily. (Patient not taking: Reported on 11/10/2020), Disp: 30 capsule, Rfl: 11    tiZANidine (ZANAFLEX) 4 MG tablet, Take 1 tablet (4 mg total) by mouth nightly as needed (muscle spasms/ pain). (Patient not taking: Reported on 11/10/2020), Disp: 40 tablet, Rfl: 0    zolpidem (AMBIEN) 5 MG Tab, Take 1 tablet (5 mg total) by mouth nightly as needed. (Patient not taking: Reported on 11/10/2020), Disp: 30 tablet, Rfl: 0  Blood pressure 125/81, pulse 87, height 5' 6" (1.676 m), weight 76.2 kg (168 lb).      Neurologic Exam     Mental Status   Oriented to person, place, and time.   Level of consciousness: alert    Cranial Nerves   Cranial nerves II through XII intact.     CN III, IV, VI   Pupils are equal, round, and reactive to light.    Motor Exam   Muscle bulk: normal  Overall muscle tone: normal  Right arm tone: normal  Left arm tone: normal  Right arm pronator drift: absent  Left arm pronator drift: absent  Right leg tone: normal  Left leg tone: normal    Strength   Strength 5/5 throughout.     Sensory Exam   Light touch normal.     Gait, Coordination, and Reflexes     Gait  Gait: normal    Coordination   Romberg: negative  Finger to nose coordination: normal    Reflexes   Right Gonsalez: absent  Left Gonsalez: absent  Right ankle clonus: absent  Left ankle clonus: absent      Physical Exam  Vitals signs and nursing note reviewed.   Constitutional:       Appearance: She is well-developed.   HENT:      Head: Normocephalic and atraumatic.   Eyes:      Pupils: Pupils are equal, round, and reactive to light.   Pulmonary:      Effort: Pulmonary effort is normal.   Skin:     General: Skin is warm and dry.   Neurological:      Mental Status: She is alert and oriented to person, place, and time.      Coordination: Finger-Nose-Finger Test and Romberg Test normal.      Gait: Gait is " intact.      Deep Tendon Reflexes: Strength normal.         Imaging:     MRI Brain reviewed. Imaging demonstrates well circumscribed left cerebellar lesion that is heterogeneously hyperintense on T2 images, likely consistent with arachnoid cyst. No significant mass effect on cerebellum or surrounding edema. No evidence of significant compression on fourth ventricle or hydrocephalus.    Assessment & Plan:   Ms. Lopez is a 40 year old female with acute onset headaches and imaging findings consistent with arachnoid cyst. We have discussed imaging and exam with the patient at length. We discussed options for treatment with observation versus surgery. The cyst has likely been there for many years as opposed to rapid growth and surgery may not improve her headaches. We will obtain prior images to evaluate for evidence of the lesion and order contrast MRI with CSF flow study. Will call her to discuss after review of images. We will refer her to our headache specialist in Neurology for treatment. In the interim, Dr. Hoang recommended Mobic daily and tizanidine at bedtime in effort to relieve some of her symptoms. We will prescribe these for her until she establishes with her office. She will call our office with any questions or concerns in the meantime.

## 2020-11-16 ENCOUNTER — DOCUMENTATION ONLY (OUTPATIENT)
Dept: REHABILITATION | Facility: HOSPITAL | Age: 40
End: 2020-11-16

## 2020-11-16 NOTE — PROGRESS NOTES
Outpatient Therapy Discharge Summary     Name: Kelly Lopez  Clinic Number: [id number]    Therapy Diagnosis: Cervicalgia; Cervical spondylosis; Neck Pain; Decreased ROM of neck  Physician: Peyton Caputo PA-C    Physician Orders: PT Eval and Treat   Medical Diagnosis from Referral: Cervical spondylosis   Cervicalgia   Evaluation Date: 11/4/2020    Date of Last visit: 11/05/2020  Total Visits Received: 2  Cancelled Visits: 6  No Show Visits: 0    Assessment    Goals:   Short Term Goals (2 Weeks):   1) Pt will demonstrate compliance with initial home exercise program as prescribed by physical therapist to improve independence with management of condition.  2) Pt to improve active range of motion in active cervical rotation by 25% to allow for improved functional mobility.  3) Pt to report cervical pain of <5/10 at worst to improve tolerance to ADLs and job duties.     Long Term Goals (4 Weeks):   1. Pt to achieve <41% limitation as measured by the FOTO to demonstrate decreased disability.  2) Pt to improve active range of motion in gross cervical ROM <10% to allow for improved functional mobility including driving.  3) Pt to report cervical pain of <2/10 at worst to improve tolerance to ADLs, job duties, sleeping.  4) Pt to increase strength to at least 5/5 of muscles tested to allow for improvement in functional activities such as maintaining good posture.    Discharge reason: Patient requested discharge and Other:  MRI found cyst on brain per patient and she is following up with neurosurgery    Plan   This patient is discharged from Physical Therapy      Job Collins, PT, DPT  11/16/2020

## 2020-11-17 ENCOUNTER — OFFICE VISIT (OUTPATIENT)
Dept: CARDIOLOGY | Facility: CLINIC | Age: 40
End: 2020-11-17
Payer: MEDICAID

## 2020-11-17 VITALS
SYSTOLIC BLOOD PRESSURE: 110 MMHG | DIASTOLIC BLOOD PRESSURE: 66 MMHG | OXYGEN SATURATION: 97 % | HEART RATE: 94 BPM | BODY MASS INDEX: 27.64 KG/M2 | RESPIRATION RATE: 16 BRPM | HEIGHT: 66 IN | WEIGHT: 172 LBS

## 2020-11-17 DIAGNOSIS — R00.2 PALPITATIONS: ICD-10-CM

## 2020-11-17 DIAGNOSIS — E78.5 DYSLIPIDEMIA: ICD-10-CM

## 2020-11-17 DIAGNOSIS — R07.89 OTHER CHEST PAIN: ICD-10-CM

## 2020-11-17 DIAGNOSIS — R06.09 DOE (DYSPNEA ON EXERTION): ICD-10-CM

## 2020-11-17 DIAGNOSIS — R06.02 SOB (SHORTNESS OF BREATH): Primary | ICD-10-CM

## 2020-11-17 PROCEDURE — 93000 ELECTROCARDIOGRAM COMPLETE: CPT | Mod: S$GLB,,, | Performed by: INTERNAL MEDICINE

## 2020-11-17 PROCEDURE — 99204 OFFICE O/P NEW MOD 45 MIN: CPT | Mod: S$GLB,,, | Performed by: INTERNAL MEDICINE

## 2020-11-17 PROCEDURE — 93000 EKG 12-LEAD: ICD-10-PCS | Mod: S$GLB,,, | Performed by: INTERNAL MEDICINE

## 2020-11-17 PROCEDURE — 99204 PR OFFICE/OUTPT VISIT, NEW, LEVL IV, 45-59 MIN: ICD-10-PCS | Mod: S$GLB,,, | Performed by: INTERNAL MEDICINE

## 2020-11-17 NOTE — LETTER
November 17, 2020      Aleksandr Fowler MD  [address] [facility] [address] [place] [address]           [facility] [place]  [address]  [place] [address]  Phone: [phone]  Fax: [phone]          Patient: Kelly Lopez   MR Number: [id number]   YOB: 1980   Date of Visit: 11/17/2020       Dear Dr. Aleksandr Fowler:    Thank you for referring Kelly Lopez to me for evaluation. Attached you will find relevant portions of my assessment and plan of care.    If you have questions, please do not hesitate to call me. I look forward to following Kelly Lopez along with you.    Sincerely,    Ashley Contreras MD    Enclosure  CC:  No Recipients    If you would like to receive this communication electronically, please contact [email or web address] or [phone] to request more information on [employer] Link access.    For providers and/or their staff who would like to refer a patient to [facility], please contact us through our one-stop-shop provider referral line, [facility], at [phone].    If you feel you have received this communication in error or would no longer like to receive these types of communications, please e-mail [email or web address]

## 2020-11-17 NOTE — PROGRESS NOTES
Subjective:    Patient ID:  Kelly Lopez is a 40 y.o. female     HPI  Patient was referred to the clinic today for evaluation of her palpitations and shortness of breath.  She reportedly has been having palpitations with a sensation of heart racing as well as skipping beats for the past few years.  Symptoms are occurring on a daily basis.  She also reports shortness of breath with exertion as well as at rest at times.  She cannot take in a deep breath.  She also reports chest pain that is not necessarily related to exertion the lasts a few minutes and spontaneously resolves.  She denies any lightheadedness, dizziness or any syncopal episodes.  She denies any lower extremity edema.  She admits to drinking about 1 and half cups of coffee per day and also drinks Coke occasionally.    Social History     Socioeconomic History    Marital status: Single     Spouse name: Not on file    Number of children: Not on file    Years of education: Not on file    Highest education level: Not on file   Occupational History    Occupation:     Social Needs    Financial resource strain: Not very hard    Food insecurity     Worry: Never true     Inability: Never true    Transportation needs     Medical: No     Non-medical: No   Tobacco Use    Smoking status: Never Smoker    Smokeless tobacco: Never Used   Substance and Sexual Activity    Alcohol use: Yes     Alcohol/week: 5.0 standard drinks     Types: 5 Glasses of wine per week     Frequency: 4 or more times a week     Drinks per session: 1 or 2     Binge frequency: Never    Drug use: No    Sexual activity: Not on file   Lifestyle    Physical activity     Days per week: 0 days     Minutes per session: 0 min    Stress: To some extent   Relationships    Social connections     Talks on phone: Not on file     Gets together: Not on file     Attends Episcopalian service: Not on file     Active member of club or organization: Not on file     Attends  "meetings of clubs or organizations: Not on file     Relationship status: Not on file   Other Topics Concern    Not on file   Social History Narrative    Not on file        Family History   Problem Relation Age of Onset    Hyperlipidemia Mother     Hyperlipidemia Father     Atrial fibrillation Father     Stroke Father     Heart murmur Sister           Current Outpatient Medications   Medication Instructions    FLUoxetine 10 mg, Oral, Daily    meloxicam (MOBIC) 15 mg, Oral, Daily    tiZANidine (ZANAFLEX) 4 mg, Oral, Nightly PRN    zolpidem (AMBIEN) 5 mg, Oral, Nightly PRN        Review of Systems   Constitution: Positive for malaise/fatigue. Negative for decreased appetite, fever, weight gain and weight loss.   HENT: Negative for ear pain and sore throat.    Eyes: Negative for double vision and pain.   Cardiovascular: Positive for chest pain, dyspnea on exertion and palpitations. Negative for claudication.   Respiratory: Negative for cough and hemoptysis.    Endocrine: Negative for heat intolerance and polydipsia.   Hematologic/Lymphatic: Negative for bleeding problem.   Skin: Negative for rash.   Musculoskeletal: Negative for stiffness.   Gastrointestinal: Negative for abdominal pain, jaundice and vomiting.   Genitourinary: Negative for dysuria.   Neurological: Negative for seizures and tremors.   Psychiatric/Behavioral: Negative for altered mental status, hallucinations and suicidal ideas. The patient has insomnia.         Objective:     Vitals:    11/17/20 1045   BP: 110/66   BP Location: Left arm   Patient Position: Sitting   BP Method: Medium (Manual)   Pulse: 94   Resp: 16   SpO2: 97%   Weight: 78 kg (172 lb)   Height: 5' 6" (1.676 m)       Physical Exam   Constitutional: She is oriented to person, place, and time. She appears well-developed and well-nourished.   HENT:   Head: Normocephalic and atraumatic.   Eyes: Pupils are equal, round, and reactive to light. Conjunctivae are normal.   Neck: Neck " supple. No JVD present.   Cardiovascular: Normal rate, regular rhythm, S1 normal, S2 normal and normal heart sounds. Exam reveals no gallop and no friction rub.   No murmur heard.  Pulses:       Carotid pulses are 2+ on the right side and 2+ on the left side.       Posterior tibial pulses are 2+ on the right side and 2+ on the left side.   Pulmonary/Chest: No stridor. No respiratory distress. She has no wheezes. She has no rales.   Abdominal: Soft. She exhibits no distension. There is no abdominal tenderness.   Musculoskeletal:         General: No edema.   Neurological: She is alert and oriented to person, place, and time.   Skin: Skin is warm and dry. No burn and no rash noted. No cyanosis. Nails show no clubbing.   Psychiatric: Her behavior is normal. She expresses no suicidal ideation.     No results found for this or any previous visit.  No results found for this or any previous visit.       Assessment:       Problem List Items Addressed This Visit        Cardiac/Vascular    Palpitations    Dyslipidemia       Other    Other chest pain    JACKSON (dyspnea on exertion)      Other Visit Diagnoses     SOB (shortness of breath)    -  Primary            Plan:       1.  Check TSH, CBC, fasting lipid panel, magnesium, CMP.  2.  Obtain an echocardiogram.  3.  Obtain regular stress test.  4.  Obtain a Holter monitor times 24 hours to evaluate for any significant arrhythmias.  5.  Return to the clinic in about 1 month or sooner if needed.

## 2020-12-10 ENCOUNTER — PATIENT OUTREACH (OUTPATIENT)
Dept: ADMINISTRATIVE | Facility: OTHER | Age: 40
End: 2020-12-10

## 2020-12-10 ENCOUNTER — TELEPHONE (OUTPATIENT)
Dept: NEUROSURGERY | Facility: CLINIC | Age: 40
End: 2020-12-10

## 2020-12-10 ENCOUNTER — OFFICE VISIT (OUTPATIENT)
Dept: NEUROSURGERY | Facility: CLINIC | Age: 40
End: 2020-12-10
Payer: MEDICAID

## 2020-12-10 VITALS
HEART RATE: 78 BPM | SYSTOLIC BLOOD PRESSURE: 118 MMHG | HEIGHT: 66 IN | DIASTOLIC BLOOD PRESSURE: 77 MMHG | WEIGHT: 172 LBS | BODY MASS INDEX: 27.64 KG/M2

## 2020-12-10 DIAGNOSIS — G93.0 INTRACRANIAL ARACHNOID CYST: Primary | ICD-10-CM

## 2020-12-10 PROCEDURE — 99999 PR PBB SHADOW E&M-EST. PATIENT-LVL III: ICD-10-PCS | Mod: PBBFAC,,, | Performed by: NEUROLOGICAL SURGERY

## 2020-12-10 PROCEDURE — 99213 OFFICE O/P EST LOW 20 MIN: CPT | Mod: PBBFAC,PN | Performed by: NEUROLOGICAL SURGERY

## 2020-12-10 PROCEDURE — 99214 OFFICE O/P EST MOD 30 MIN: CPT | Mod: S$PBB,,, | Performed by: NEUROLOGICAL SURGERY

## 2020-12-10 PROCEDURE — 99999 PR PBB SHADOW E&M-EST. PATIENT-LVL III: CPT | Mod: PBBFAC,,, | Performed by: NEUROLOGICAL SURGERY

## 2020-12-10 PROCEDURE — 99214 PR OFFICE/OUTPT VISIT, EST, LEVL IV, 30-39 MIN: ICD-10-PCS | Mod: S$PBB,,, | Performed by: NEUROLOGICAL SURGERY

## 2020-12-10 NOTE — PROGRESS NOTES
Neurosurgery History & Physical    Patient ID: Kelly Lopez is a 40 y.o. female.    Chief Complaint   Patient presents with    Results     MRI results     Interval History 12/10/20:   Ms. Lopez returns for review of recent imaging. We ordered MRI brain w/wo contrast with CSF flow studies. She reports minimal occipital headaches. Her pain has improved significantly.    She has not been contacted by [facility] for scheduling.     History of Present Illness:     40 year old female with history of neck pain who was in her usual state of health until September when she had acute onset of headaches. No significant events or injury surrounding the onset. Her headaches are located in the occipital region. They are constant but severity waxes and wanes. She does not take any medication for this. When the pain is severe, she experiences nausea, blurred vision. She reports intense pressure in occipital scalp/posterior neck. Her pain interferes with her daily activities and quality of life. She was evaluated by Dr. Cruz in Pain Mgmt who previously treated her for cervical spine issues who ordered repeat imaging and requested she follow up with NSGY regarding results. She has participated in Physical Therapy as they thought this may be related to cervical spine. She has also trialed anti-spasmodics in effort to relieve pain without success. She denies weakness, numbness/tingling, gait instability.     She has undergone prior imaging and injections for cervical spine. Has never seen NSGY or had previous brain imaging.        Review of Systems   Constitutional: Negative for activity change, fatigue and fever.   HENT: Negative for trouble swallowing.    Eyes: Negative for visual disturbance.   Respiratory: Negative for chest tightness and shortness of breath.    Cardiovascular: Negative for chest pain.   Gastrointestinal: Positive for nausea. Negative for vomiting.   Genitourinary: Negative for difficulty  urinating and frequency.   Musculoskeletal: Positive for neck pain. Negative for back pain and gait problem.   Skin: Negative for wound.   Neurological: Positive for headaches. Negative for weakness and numbness.   Psychiatric/Behavioral: Negative for confusion and suicidal ideas.       Past Medical History:   Diagnosis Date    Anxiety     Cervicalgia     Eczema of external ear, bilateral     H/O bronchitis     Insomnia due to medical condition     Palpitations      Social History     Socioeconomic History    Marital status: Single     Spouse name: Not on file    Number of children: Not on file    Years of education: Not on file    Highest education level: Not on file   Occupational History    Occupation:     Social Needs    Financial resource strain: Not very hard    Food insecurity     Worry: Never true     Inability: Never true    Transportation needs     Medical: No     Non-medical: No   Tobacco Use    Smoking status: Never Smoker    Smokeless tobacco: Never Used   Substance and Sexual Activity    Alcohol use: Yes     Alcohol/week: 5.0 standard drinks     Types: 5 Glasses of wine per week     Frequency: 4 or more times a week     Drinks per session: 1 or 2     Binge frequency: Never    Drug use: No    Sexual activity: Not on file   Lifestyle    Physical activity     Days per week: 0 days     Minutes per session: 0 min    Stress: To some extent   Relationships    Social connections     Talks on phone: Not on file     Gets together: Not on file     Attends Mandaen service: Not on file     Active member of club or organization: Not on file     Attends meetings of clubs or organizations: Not on file     Relationship status: Not on file   Other Topics Concern    Not on file   Social History Narrative    Not on file     Family History   Problem Relation Age of Onset    Hyperlipidemia Mother     Hyperlipidemia Father     Atrial fibrillation Father     Stroke Father      "Heart murmur Sister      Review of patient's allergies indicates:  No Known Allergies    Current Outpatient Medications:     FLUoxetine 10 MG capsule, Take 1 capsule (10 mg total) by mouth once daily. (Patient not taking: Reported on 12/10/2020), Disp: 30 capsule, Rfl: 11    meloxicam (MOBIC) 15 MG tablet, Take 1 tablet (15 mg total) by mouth once daily. (Patient not taking: Reported on 12/10/2020), Disp: 30 tablet, Rfl: 0    tiZANidine (ZANAFLEX) 4 MG tablet, Take 1 tablet (4 mg total) by mouth nightly as needed (muscle spasms/ pain). (Patient not taking: Reported on 12/10/2020), Disp: 40 tablet, Rfl: 0    zolpidem (AMBIEN) 5 MG Tab, Take 1 tablet (5 mg total) by mouth nightly as needed. (Patient not taking: Reported on 12/10/2020), Disp: 30 tablet, Rfl: 0  Blood pressure 118/77, pulse 78, height 5' 6" (1.676 m), weight 78 kg (172 lb).      Neurologic Exam     Mental Status   Oriented to person, place, and time.   Level of consciousness: alert    Cranial Nerves   Cranial nerves II through XII intact.     CN III, IV, VI   Pupils are equal, round, and reactive to light.    Motor Exam   Muscle bulk: normal  Overall muscle tone: normal  Right arm tone: normal  Left arm tone: normal  Right arm pronator drift: absent  Left arm pronator drift: absent  Right leg tone: normal  Left leg tone: normal    Strength   Strength 5/5 throughout.     Sensory Exam   Light touch normal.     Gait, Coordination, and Reflexes     Gait  Gait: normal    Coordination   Romberg: negative  Finger to nose coordination: normal    Reflexes   Right Gonsalez: absent  Left Gonsalez: absent  Right ankle clonus: absent  Left ankle clonus: absent      Physical Exam  Vitals signs and nursing note reviewed.   Constitutional:       Appearance: She is well-developed.   HENT:      Head: Normocephalic and atraumatic.   Eyes:      Pupils: Pupils are equal, round, and reactive to light.   Pulmonary:      Effort: Pulmonary effort is normal.   Skin:     " General: Skin is warm and dry.   Neurological:      Mental Status: She is alert and oriented to person, place, and time.      Coordination: Finger-Nose-Finger Test and Romberg Test normal.      Gait: Gait is intact.      Deep Tendon Reflexes: Strength normal.         Imaging:   MRI 12/2020 reviewed.  3.2 x 3.4 x 3.8 cm left paramedian retrocerebellar cyst. No mass effect or surrounding edema. There is thinning of occipital bone. No evidence of disrupted flow ventral and dorsal to foramen magnum. No significant changes from previous imaging.    Assessment & Plan:   Ms. Lopez is a 40 year old female with headaches and imaging findings of retrocerebellar arachnoid cyst. We have discussed new imaging findings with the patient as well as options for treatment with observation versus surgery. This is very likely a chronic proocess given the evidence of overlying thinning of occipital bone. Surgery and risks/benefits of intervention was discussed but this may not cure her headaches. She is not interested in pursuing surgery at this time and opted to continue monitoring. We will follow up with her in 6 months with Brain MRI w/wo contrast. In the interim, she should follow up with Headache Specialist. We will reach out to Neurology to contact her for scheduling.     Total visit time 45 minutes with greater than 50% in face to face counseling.

## 2020-12-10 NOTE — PROGRESS NOTES
Health Maintenance Due   Topic Date Due    Hepatitis C Screening  1980    Lipid Panel  1980    TETANUS VACCINE  08/19/1998     Updates were requested from care everywhere.  Chart was reviewed for overdue Proactive [facility] Encounters ([facility]AN[facility]) topics (CRS, Breast Cancer Screening, Eye exam)  Health Maintenance has been updated.  LINKS immunization registry triggered.  Immunizations were reconciled.

## 2020-12-10 NOTE — TELEPHONE ENCOUNTER
Can we reach out to Dr. Hoang's office to set her up with headache clinic?     Can you please schedule her with Dr. Hoang or NP for headache.

## 2021-01-12 ENCOUNTER — PATIENT OUTREACH (OUTPATIENT)
Dept: ADMINISTRATIVE | Facility: OTHER | Age: 41
End: 2021-01-12

## 2021-01-13 ENCOUNTER — TELEPHONE (OUTPATIENT)
Dept: NEUROLOGY | Facility: CLINIC | Age: 41
End: 2021-01-13

## 2021-06-09 DIAGNOSIS — G93.0 CEREBRAL CYSTS: ICD-10-CM

## 2021-06-15 ENCOUNTER — TELEPHONE (OUTPATIENT)
Dept: NEUROSURGERY | Facility: CLINIC | Age: 41
End: 2021-06-15

## 2021-07-07 ENCOUNTER — TELEPHONE (OUTPATIENT)
Dept: NEUROSURGERY | Facility: CLINIC | Age: 41
End: 2021-07-07

## 2021-07-18 ENCOUNTER — PATIENT OUTREACH (OUTPATIENT)
Dept: ADMINISTRATIVE | Facility: OTHER | Age: 41
End: 2021-07-18

## 2021-07-20 ENCOUNTER — TELEPHONE (OUTPATIENT)
Dept: NEUROSURGERY | Facility: CLINIC | Age: 41
End: 2021-07-20

## 2021-07-20 ENCOUNTER — OFFICE VISIT (OUTPATIENT)
Dept: NEUROSURGERY | Facility: CLINIC | Age: 41
End: 2021-07-20
Payer: MEDICAID

## 2021-07-20 DIAGNOSIS — G93.0 ARACHNOID CYST: Primary | ICD-10-CM

## 2021-07-20 DIAGNOSIS — R51.9 OCCIPITAL HEADACHE: ICD-10-CM

## 2021-07-20 PROCEDURE — 99213 PR OFFICE/OUTPT VISIT, EST, LEVL III, 20-29 MIN: ICD-10-PCS | Mod: 95,,, | Performed by: NEUROLOGICAL SURGERY

## 2021-07-20 PROCEDURE — 99213 OFFICE O/P EST LOW 20 MIN: CPT | Mod: 95,,, | Performed by: NEUROLOGICAL SURGERY

## 2021-07-26 ENCOUNTER — TELEPHONE (OUTPATIENT)
Dept: NEUROLOGY | Facility: CLINIC | Age: 41
End: 2021-07-26

## 2021-09-16 ENCOUNTER — TELEPHONE (OUTPATIENT)
Dept: FAMILY MEDICINE | Facility: CLINIC | Age: 41
End: 2021-09-16

## 2021-09-21 ENCOUNTER — OFFICE VISIT (OUTPATIENT)
Dept: FAMILY MEDICINE | Facility: CLINIC | Age: 41
End: 2021-09-21
Payer: MEDICAID

## 2021-09-21 ENCOUNTER — LAB VISIT (OUTPATIENT)
Dept: LAB | Facility: HOSPITAL | Age: 41
End: 2021-09-21
Attending: PHYSICIAN ASSISTANT
Payer: MEDICAID

## 2021-09-21 VITALS
HEART RATE: 83 BPM | SYSTOLIC BLOOD PRESSURE: 122 MMHG | BODY MASS INDEX: 26.33 KG/M2 | OXYGEN SATURATION: 98 % | DIASTOLIC BLOOD PRESSURE: 76 MMHG | WEIGHT: 163.13 LBS

## 2021-09-21 DIAGNOSIS — M85.89 OSTEOPENIA OF MULTIPLE SITES: ICD-10-CM

## 2021-09-21 DIAGNOSIS — R09.89 HYPERINFLATION OF LUNGS: ICD-10-CM

## 2021-09-21 DIAGNOSIS — N39.3 STRESS INCONTINENCE: ICD-10-CM

## 2021-09-21 DIAGNOSIS — H60.543 ECZEMA OF EXTERNAL EAR, BILATERAL: ICD-10-CM

## 2021-09-21 DIAGNOSIS — F41.9 ANXIETY: ICD-10-CM

## 2021-09-21 DIAGNOSIS — Z01.818 PRE-OP EXAM: ICD-10-CM

## 2021-09-21 DIAGNOSIS — Z01.818 PRE-OP EXAM: Primary | ICD-10-CM

## 2021-09-21 LAB
ALBUMIN SERPL BCP-MCNC: 4 G/DL (ref 3.5–5.2)
ALP SERPL-CCNC: 71 U/L (ref 55–135)
ALT SERPL W/O P-5'-P-CCNC: 22 U/L (ref 10–44)
ANION GAP SERPL CALC-SCNC: 11 MMOL/L (ref 8–16)
APTT BLDCRRT: 25.9 SEC (ref 21–32)
AST SERPL-CCNC: 21 U/L (ref 10–40)
BASOPHILS # BLD AUTO: 0.05 K/UL (ref 0–0.2)
BASOPHILS NFR BLD: 0.8 % (ref 0–1.9)
BILIRUB SERPL-MCNC: 0.6 MG/DL (ref 0.1–1)
BUN SERPL-MCNC: 9 MG/DL (ref 6–20)
CALCIUM SERPL-MCNC: 9.2 MG/DL (ref 8.7–10.5)
CHLORIDE SERPL-SCNC: 107 MMOL/L (ref 95–110)
CO2 SERPL-SCNC: 20 MMOL/L (ref 23–29)
CREAT SERPL-MCNC: 0.7 MG/DL (ref 0.5–1.4)
DIFFERENTIAL METHOD: NORMAL
EOSINOPHIL # BLD AUTO: 0.2 K/UL (ref 0–0.5)
EOSINOPHIL NFR BLD: 3.5 % (ref 0–8)
ERYTHROCYTE [DISTWIDTH] IN BLOOD BY AUTOMATED COUNT: 12.3 % (ref 11.5–14.5)
EST. GFR  (AFRICAN AMERICAN): >60 ML/MIN/1.73 M^2
EST. GFR  (NON AFRICAN AMERICAN): >60 ML/MIN/1.73 M^2
GLUCOSE SERPL-MCNC: 92 MG/DL (ref 70–110)
HCG INTACT+B SERPL-ACNC: <2.4 MIU/ML
HCT VFR BLD AUTO: 38.9 % (ref 37–48.5)
HGB BLD-MCNC: 13 G/DL (ref 12–16)
IMM GRANULOCYTES # BLD AUTO: 0.02 K/UL (ref 0–0.04)
IMM GRANULOCYTES NFR BLD AUTO: 0.3 % (ref 0–0.5)
INR PPP: 0.9 (ref 0.8–1.2)
LYMPHOCYTES # BLD AUTO: 1.6 K/UL (ref 1–4.8)
LYMPHOCYTES NFR BLD: 24.9 % (ref 18–48)
MCH RBC QN AUTO: 29.3 PG (ref 27–31)
MCHC RBC AUTO-ENTMCNC: 33.4 G/DL (ref 32–36)
MCV RBC AUTO: 88 FL (ref 82–98)
MONOCYTES # BLD AUTO: 0.5 K/UL (ref 0.3–1)
MONOCYTES NFR BLD: 7.2 % (ref 4–15)
NEUTROPHILS # BLD AUTO: 4 K/UL (ref 1.8–7.7)
NEUTROPHILS NFR BLD: 63.3 % (ref 38–73)
NRBC BLD-RTO: 0 /100 WBC
PLATELET # BLD AUTO: 360 K/UL (ref 150–450)
PMV BLD AUTO: 10 FL (ref 9.2–12.9)
POTASSIUM SERPL-SCNC: 4.1 MMOL/L (ref 3.5–5.1)
PROT SERPL-MCNC: 7.5 G/DL (ref 6–8.4)
PROTHROMBIN TIME: 10.2 SEC (ref 9–12.5)
RBC # BLD AUTO: 4.44 M/UL (ref 4–5.4)
SODIUM SERPL-SCNC: 138 MMOL/L (ref 136–145)
WBC # BLD AUTO: 6.26 K/UL (ref 3.9–12.7)

## 2021-09-21 PROCEDURE — 99999 PR PBB SHADOW E&M-EST. PATIENT-LVL IV: ICD-10-PCS | Mod: PBBFAC,,, | Performed by: PHYSICIAN ASSISTANT

## 2021-09-21 PROCEDURE — 84702 CHORIONIC GONADOTROPIN TEST: CPT | Performed by: PHYSICIAN ASSISTANT

## 2021-09-21 PROCEDURE — 99999 PR PBB SHADOW E&M-EST. PATIENT-LVL IV: CPT | Mod: PBBFAC,,, | Performed by: PHYSICIAN ASSISTANT

## 2021-09-21 PROCEDURE — 99214 OFFICE O/P EST MOD 30 MIN: CPT | Mod: PBBFAC,PO | Performed by: PHYSICIAN ASSISTANT

## 2021-09-21 PROCEDURE — 36415 COLL VENOUS BLD VENIPUNCTURE: CPT | Mod: PO | Performed by: PHYSICIAN ASSISTANT

## 2021-09-21 PROCEDURE — 99214 OFFICE O/P EST MOD 30 MIN: CPT | Mod: S$PBB,,, | Performed by: PHYSICIAN ASSISTANT

## 2021-09-21 PROCEDURE — 85025 COMPLETE CBC W/AUTO DIFF WBC: CPT | Performed by: PHYSICIAN ASSISTANT

## 2021-09-21 PROCEDURE — 80053 COMPREHEN METABOLIC PANEL: CPT | Performed by: PHYSICIAN ASSISTANT

## 2021-09-21 PROCEDURE — 85610 PROTHROMBIN TIME: CPT | Mod: PO | Performed by: PHYSICIAN ASSISTANT

## 2021-09-21 PROCEDURE — 87389 HIV-1 AG W/HIV-1&-2 AB AG IA: CPT | Performed by: PHYSICIAN ASSISTANT

## 2021-09-21 PROCEDURE — 85730 THROMBOPLASTIN TIME PARTIAL: CPT | Mod: PO | Performed by: PHYSICIAN ASSISTANT

## 2021-09-21 PROCEDURE — 99214 PR OFFICE/OUTPT VISIT, EST, LEVL IV, 30-39 MIN: ICD-10-PCS | Mod: S$PBB,,, | Performed by: PHYSICIAN ASSISTANT

## 2021-09-21 RX ORDER — FLUOXETINE 10 MG/1
10 CAPSULE ORAL DAILY
Qty: 30 CAPSULE | Refills: 11 | Status: SHIPPED | OUTPATIENT
Start: 2021-09-21 | End: 2022-09-21

## 2021-09-21 RX ORDER — HYDROCORTISONE 25 MG/G
CREAM TOPICAL 2 TIMES DAILY
Qty: 28 G | Refills: 0 | Status: SHIPPED | OUTPATIENT
Start: 2021-09-21 | End: 2021-10-01

## 2021-09-22 ENCOUNTER — HOSPITAL ENCOUNTER (OUTPATIENT)
Dept: RADIOLOGY | Facility: HOSPITAL | Age: 41
Discharge: HOME | End: 2021-09-22
Attending: PHYSICIAN ASSISTANT
Payer: MEDICAID

## 2021-09-22 ENCOUNTER — PATIENT MESSAGE (OUTPATIENT)
Dept: FAMILY MEDICINE | Facility: CLINIC | Age: 41
End: 2021-09-22

## 2021-09-22 DIAGNOSIS — M85.89 OSTEOPENIA OF MULTIPLE SITES: ICD-10-CM

## 2021-09-22 LAB — HIV 1+2 AB+HIV1 P24 AG SERPL QL IA: NEGATIVE

## 2021-09-22 PROCEDURE — 77080 DXA BONE DENSITY AXIAL: CPT | Mod: TC,PO

## 2021-09-22 PROCEDURE — 77080 DEXA BONE DENSITY SPINE HIP: ICD-10-PCS | Mod: 26,,, | Performed by: RADIOLOGY

## 2021-09-22 PROCEDURE — 77080 DXA BONE DENSITY AXIAL: CPT | Mod: 26,,, | Performed by: RADIOLOGY

## 2021-09-23 ENCOUNTER — PATIENT MESSAGE (OUTPATIENT)
Dept: FAMILY MEDICINE | Facility: CLINIC | Age: 41
End: 2021-09-23

## 2021-09-23 ENCOUNTER — TELEPHONE (OUTPATIENT)
Dept: FAMILY MEDICINE | Facility: CLINIC | Age: 41
End: 2021-09-23

## 2021-09-23 DIAGNOSIS — Z01.818 PRE-OP EXAM: Primary | ICD-10-CM

## 2021-09-29 ENCOUNTER — PATIENT MESSAGE (OUTPATIENT)
Dept: FAMILY MEDICINE | Facility: CLINIC | Age: 41
End: 2021-09-29

## 2021-10-01 ENCOUNTER — PATIENT MESSAGE (OUTPATIENT)
Dept: FAMILY MEDICINE | Facility: CLINIC | Age: 41
End: 2021-10-01

## 2021-10-04 ENCOUNTER — PATIENT MESSAGE (OUTPATIENT)
Dept: FAMILY MEDICINE | Facility: CLINIC | Age: 41
End: 2021-10-04

## 2021-10-05 ENCOUNTER — PATIENT MESSAGE (OUTPATIENT)
Dept: FAMILY MEDICINE | Facility: CLINIC | Age: 41
End: 2021-10-05

## 2021-10-08 ENCOUNTER — PATIENT MESSAGE (OUTPATIENT)
Dept: FAMILY MEDICINE | Facility: CLINIC | Age: 41
End: 2021-10-08

## 2021-11-18 DIAGNOSIS — Z12.31 OTHER SCREENING MAMMOGRAM: ICD-10-CM

## 2022-05-13 ENCOUNTER — TELEPHONE (OUTPATIENT)
Dept: NEUROSURGERY | Facility: CLINIC | Age: 42
End: 2022-05-13
Payer: MEDICAID

## 2022-05-13 NOTE — TELEPHONE ENCOUNTER
----- Message from Karin Esposito sent at 5/13/2022  9:09 AM CDT -----  Type: Patient Call Back    Who called: [facility]     What is the request in detail: Requesting patient's last MRI results to be faxed over to the clinic. fax: [phone]     Can the clinic reply by [facility]? No     Would the patient rather a call back or a response via [facility]? Call back     Best call back number: [phone]

## 2022-05-31 ENCOUNTER — PATIENT MESSAGE (OUTPATIENT)
Dept: ADMINISTRATIVE | Facility: HOSPITAL | Age: 42
End: 2022-05-31
Payer: MEDICAID

## 2022-08-24 ENCOUNTER — PATIENT MESSAGE (OUTPATIENT)
Dept: ADMINISTRATIVE | Facility: HOSPITAL | Age: 42
End: 2022-08-24
Payer: MEDICAID

## 2022-10-10 ENCOUNTER — PATIENT MESSAGE (OUTPATIENT)
Dept: ADMINISTRATIVE | Facility: HOSPITAL | Age: 42
End: 2022-10-10
Payer: MEDICAID

## 2022-10-20 ENCOUNTER — APPOINTMENT (OUTPATIENT)
Dept: URBAN - METROPOLITAN AREA CLINIC 285 | Age: 42
Setting detail: DERMATOLOGY
End: 2022-10-20

## 2022-10-20 DIAGNOSIS — Z41.9 ENCOUNTER FOR PROCEDURE FOR PURPOSES OTHER THAN REMEDYING HEALTH STATE, UNSPECIFIED: ICD-10-CM

## 2022-10-20 PROCEDURE — OTHER ADDITIONAL NOTES: OTHER

## 2022-10-20 PROCEDURE — OTHER COSMETIC CONSULTATION: BOTOX: OTHER

## 2022-10-20 PROCEDURE — OTHER COSMETIC CONSULTATION: FILLERS: OTHER

## 2022-10-20 NOTE — PROCEDURE: ADDITIONAL NOTES
Render Risk Assessment In Note?: no
Detail Level: Simple
Additional Notes: We discussed using 0.5 mL of Juvederm Ultra XC to her lips to start. I will refrigerate the remaining 0.5 mL for up to two weeks. The patient reports getting 1.0 mL of Juvederm Ultra XC in May 2021 and she did not like how they looked. \\n\\nI also recommended using 12 units of Botox to her glabella because when speaking, she is very expressive in that area. She reports not liking the static line she is developing in between her eyebrows. \\n\\nThe patients wedding is in one month.

## 2022-10-25 ENCOUNTER — APPOINTMENT (OUTPATIENT)
Dept: URBAN - METROPOLITAN AREA CLINIC 285 | Age: 42
Setting detail: DERMATOLOGY
End: 2022-10-25

## 2022-10-25 DIAGNOSIS — Z41.9 ENCOUNTER FOR PROCEDURE FOR PURPOSES OTHER THAN REMEDYING HEALTH STATE, UNSPECIFIED: ICD-10-CM

## 2022-10-25 PROCEDURE — OTHER JUVEDERM ULTRA XC INJECTION: OTHER

## 2022-10-25 PROCEDURE — OTHER BOTOX (U OR CC): OTHER

## 2022-10-25 PROCEDURE — OTHER ADDITIONAL NOTES: OTHER

## 2022-10-25 PROCEDURE — OTHER BOTOX (U OR CC) ADDITIVE: OTHER

## 2022-10-25 ASSESSMENT — LOCATION DETAILED DESCRIPTION DERM
LOCATION DETAILED: RIGHT UPPER CUTANEOUS LIP
LOCATION DETAILED: GLABELLA
LOCATION DETAILED: LEFT INFERIOR VERMILION LIP
LOCATION DETAILED: RIGHT SUPERIOR VERMILION LIP
LOCATION DETAILED: LEFT SUPERIOR VERMILION BORDER
LOCATION DETAILED: RIGHT SUPERIOR VERMILION BORDER
LOCATION DETAILED: RIGHT INFERIOR VERMILION LIP
LOCATION DETAILED: LEFT UPPER CUTANEOUS LIP
LOCATION DETAILED: RIGHT MEDIAL EYEBROW
LOCATION DETAILED: LEFT SUPERIOR VERMILION LIP

## 2022-10-25 ASSESSMENT — LOCATION SIMPLE DESCRIPTION DERM
LOCATION SIMPLE: LEFT UPPER LIP
LOCATION SIMPLE: RIGHT LIP
LOCATION SIMPLE: RIGHT UPPER LIP
LOCATION SIMPLE: GLABELLA
LOCATION SIMPLE: LEFT LIP
LOCATION SIMPLE: RIGHT EYEBROW

## 2022-10-25 ASSESSMENT — LOCATION ZONE DERM
LOCATION ZONE: LIP
LOCATION ZONE: FACE

## 2022-10-25 NOTE — PROCEDURE: ADDITIONAL NOTES
Render Risk Assessment In Note?: no
Detail Level: Simple
Additional Notes: NERVE BLOCK NECESSARY. THE PATIENT DOES NOT RESPOND TO TOPICAL LIDOCAINE.

## 2022-10-25 NOTE — PROCEDURE: JUVEDERM ULTRA XC INJECTION
Procedural Text: The treatment area and surrounding area was cleansed with Hibiclens and alcohol prior to the procedure. I washed my hands prior to putting gloves on and used aseptic techniques to keep the patient as clean as possible while injecting. I aspirated prior to injecting any filler. No back-flow of blood was observed while injecting. No blanching was present during our treatment session. The filler was administered to the treatment areas noted above. A capillary refill test was performed on the nasolabial area, marionette area, and lips (superior and inferior). The capillary refill time in each area was under two seconds. The patient had no concerns or questions after we completed our treatment. Patient instructed to apply ice every hour for 10-15 minutes at a time to help swelling and discomfort.

## 2022-10-25 NOTE — PROCEDURE: JUVEDERM ULTRA XC INJECTION
Price (Use Numbers Only, No Special Characters Or $): 828 Price (Use Numbers Only, No Special Characters Or $): 165

## 2022-11-08 ENCOUNTER — APPOINTMENT (OUTPATIENT)
Dept: URBAN - METROPOLITAN AREA CLINIC 285 | Age: 42
Setting detail: DERMATOLOGY
End: 2022-11-08

## 2022-11-08 ENCOUNTER — APPOINTMENT (OUTPATIENT)
Dept: URBAN - METROPOLITAN AREA CLINIC 285 | Age: 42
Setting detail: DERMATOLOGY
End: 2022-11-17

## 2022-11-08 DIAGNOSIS — L70.0 ACNE VULGARIS: ICD-10-CM

## 2022-11-08 DIAGNOSIS — Z41.9 ENCOUNTER FOR PROCEDURE FOR PURPOSES OTHER THAN REMEDYING HEALTH STATE, UNSPECIFIED: ICD-10-CM

## 2022-11-08 PROCEDURE — OTHER JUVEDERM ULTRA XC INJECTION: OTHER

## 2022-11-08 PROCEDURE — OTHER ADDITIONAL NOTES: OTHER

## 2022-11-08 PROCEDURE — OTHER SILKPEEL DERMALINFUSION: OTHER

## 2022-11-08 PROCEDURE — OTHER COSMETIC FOLLOW-UP: OTHER

## 2022-11-08 ASSESSMENT — LOCATION DETAILED DESCRIPTION DERM
LOCATION DETAILED: LEFT INFERIOR CENTRAL MALAR CHEEK
LOCATION DETAILED: LEFT SUPERIOR VERMILION LIP
LOCATION DETAILED: LEFT SUPERIOR VERMILION BORDER
LOCATION DETAILED: RIGHT SUPERIOR VERMILION BORDER
LOCATION DETAILED: RIGHT UPPER CUTANEOUS LIP
LOCATION DETAILED: LEFT UPPER CUTANEOUS LIP
LOCATION DETAILED: RIGHT SUPERIOR VERMILION LIP

## 2022-11-08 ASSESSMENT — LOCATION SIMPLE DESCRIPTION DERM
LOCATION SIMPLE: LEFT LIP
LOCATION SIMPLE: LEFT UPPER LIP
LOCATION SIMPLE: LEFT CHEEK
LOCATION SIMPLE: RIGHT LIP
LOCATION SIMPLE: RIGHT UPPER LIP

## 2022-11-08 ASSESSMENT — LOCATION ZONE DERM
LOCATION ZONE: LIP
LOCATION ZONE: FACE

## 2022-11-08 NOTE — PROCEDURE: COSMETIC FOLLOW-UP
Global Improvement: Marked
Treatment (Optional): Botox
Patient Satisfaction: Pleased
Treatment (Optional): Filler Injection
Detail Level: Zone
Side Effects Or Complications: None
Side Effects Or Complications: Under correction
Global Improvement: Good
Patient Satisfaction: Very pleased

## 2022-11-08 NOTE — PROCEDURE: SILKPEEL DERMALINFUSION
Post-Care Instructions: I reviewed with the patient in detail post-care instructions. Patient should stay away from the sun and wear sun protection until treated areas are fully healed.
Body Treatment Head Used?: Not Used
Consent: Written consent obtained, risks reviewed including but not limited to crusting, scabbing, blistering, scarring, darker or lighter pigmentary change, bruising, and/or incomplete response.
Intelliflow Settin%
Vacuum Pressure In Inches: 4
Detail Level: Zone
Solution Used: Vitamin C Skin Hydrating Solution

## 2022-11-08 NOTE — PROCEDURE: JUVEDERM ULTRA XC INJECTION
Procedural Text: The treatment area and surrounding area was cleansed with Hibiclens and alcohol prior to the procedure. I washed my hands prior to putting gloves on and used aseptic techniques to keep the patient as clean as possible while injecting. I aspirated prior to injecting any filler. No back-flow of blood was observed while injecting. No blanching was present during our treatment session. The filler was administered to the treatment areas noted above. A capillary refill test was performed on the nasolabial area, marionette area, and lips (superior and inferior). The capillary refill time in each area was under three seconds. The patient had no concerns or questions after we completed our treatment. Patient instructed to apply ice every hour for 10-15 minutes at a time to help swelling and discomfort.

## 2022-11-08 NOTE — PROCEDURE: ADDITIONAL NOTES
Render Risk Assessment In Note?: no
Additional Notes: 4.0 mL of lido and Epi injected as a nerve block. The patient was much more comfortable at todays treatment than she was at her last visit.
Detail Level: Simple
Additional Notes: An additional four units could have been used to the  heads.